# Patient Record
Sex: MALE | Race: WHITE | NOT HISPANIC OR LATINO | URBAN - METROPOLITAN AREA
[De-identification: names, ages, dates, MRNs, and addresses within clinical notes are randomized per-mention and may not be internally consistent; named-entity substitution may affect disease eponyms.]

---

## 2017-06-05 ENCOUNTER — OUTPATIENT (OUTPATIENT)
Dept: OUTPATIENT SERVICES | Facility: HOSPITAL | Age: 82
LOS: 1 days | Discharge: HOME | End: 2017-06-05

## 2017-06-28 DIAGNOSIS — C61 MALIGNANT NEOPLASM OF PROSTATE: ICD-10-CM

## 2018-04-12 ENCOUNTER — OUTPATIENT (OUTPATIENT)
Dept: OUTPATIENT SERVICES | Facility: HOSPITAL | Age: 83
LOS: 1 days | Discharge: HOME | End: 2018-04-12

## 2018-04-12 DIAGNOSIS — I25.10 ATHEROSCLEROTIC HEART DISEASE OF NATIVE CORONARY ARTERY WITHOUT ANGINA PECTORIS: ICD-10-CM

## 2018-04-12 DIAGNOSIS — E78.00 PURE HYPERCHOLESTEROLEMIA, UNSPECIFIED: ICD-10-CM

## 2018-04-12 DIAGNOSIS — Z79.899 OTHER LONG TERM (CURRENT) DRUG THERAPY: ICD-10-CM

## 2018-05-22 ENCOUNTER — MESSAGE (OUTPATIENT)
Age: 83
End: 2018-05-22

## 2018-06-18 ENCOUNTER — OUTPATIENT (OUTPATIENT)
Dept: OUTPATIENT SERVICES | Facility: HOSPITAL | Age: 83
LOS: 1 days | Discharge: HOME | End: 2018-06-18

## 2018-06-18 DIAGNOSIS — C61 MALIGNANT NEOPLASM OF PROSTATE: ICD-10-CM

## 2018-06-19 LAB — PSA SERPL-MCNC: 0.21 NG/ML

## 2018-07-18 ENCOUNTER — APPOINTMENT (OUTPATIENT)
Dept: UROLOGY | Facility: CLINIC | Age: 83
End: 2018-07-18
Payer: MEDICARE

## 2018-07-18 VITALS
SYSTOLIC BLOOD PRESSURE: 199 MMHG | HEART RATE: 58 BPM | BODY MASS INDEX: 29.41 KG/M2 | DIASTOLIC BLOOD PRESSURE: 81 MMHG | WEIGHT: 183 LBS | HEIGHT: 66 IN

## 2018-07-18 PROCEDURE — 99213 OFFICE O/P EST LOW 20 MIN: CPT

## 2019-03-27 ENCOUNTER — OUTPATIENT (OUTPATIENT)
Dept: OUTPATIENT SERVICES | Facility: HOSPITAL | Age: 84
LOS: 1 days | Discharge: HOME | End: 2019-03-27

## 2019-03-27 DIAGNOSIS — I10 ESSENTIAL (PRIMARY) HYPERTENSION: ICD-10-CM

## 2019-03-27 DIAGNOSIS — E78.5 HYPERLIPIDEMIA, UNSPECIFIED: ICD-10-CM

## 2019-03-27 DIAGNOSIS — R53.83 OTHER FATIGUE: ICD-10-CM

## 2019-04-11 ENCOUNTER — RECORD ABSTRACTING (OUTPATIENT)
Age: 84
End: 2019-04-11

## 2019-04-11 RX ORDER — UBIDECARENONE 200 MG
CAPSULE ORAL
Refills: 0 | Status: ACTIVE | COMMUNITY

## 2019-04-11 RX ORDER — CHROMIUM 200 MCG
TABLET ORAL
Refills: 0 | Status: ACTIVE | COMMUNITY

## 2019-04-11 RX ORDER — ASCORBIC ACID 500 MG
TABLET ORAL
Refills: 0 | Status: ACTIVE | COMMUNITY

## 2019-04-11 RX ORDER — ASPIRIN 81 MG
81 TABLET, DELAYED RELEASE (ENTERIC COATED) ORAL DAILY
Refills: 0 | Status: ACTIVE | COMMUNITY

## 2019-05-01 ENCOUNTER — APPOINTMENT (OUTPATIENT)
Dept: CARDIOLOGY | Facility: CLINIC | Age: 84
End: 2019-05-01
Payer: MEDICARE

## 2019-05-01 PROCEDURE — 99214 OFFICE O/P EST MOD 30 MIN: CPT

## 2019-05-01 PROCEDURE — 93000 ELECTROCARDIOGRAM COMPLETE: CPT

## 2019-07-23 ENCOUNTER — APPOINTMENT (OUTPATIENT)
Dept: UROLOGY | Facility: CLINIC | Age: 84
End: 2019-07-23
Payer: MEDICARE

## 2019-07-23 VITALS
HEART RATE: 74 BPM | DIASTOLIC BLOOD PRESSURE: 75 MMHG | HEIGHT: 66 IN | SYSTOLIC BLOOD PRESSURE: 153 MMHG | BODY MASS INDEX: 27.48 KG/M2 | WEIGHT: 171 LBS

## 2019-07-23 LAB
BILIRUB UR QL STRIP: NORMAL
CLARITY UR: CLEAR
COLLECTION METHOD: NORMAL
GLUCOSE UR-MCNC: NORMAL
HCG UR QL: NORMAL EU/DL
HGB UR QL STRIP.AUTO: NORMAL
KETONES UR-MCNC: NORMAL
LEUKOCYTE ESTERASE UR QL STRIP: NORMAL
NITRITE UR QL STRIP: NORMAL
PH UR STRIP: 6
PROT UR STRIP-MCNC: ABNORMAL
SP GR UR STRIP: 1.01

## 2019-07-23 PROCEDURE — 99213 OFFICE O/P EST LOW 20 MIN: CPT

## 2019-07-23 PROCEDURE — 81003 URINALYSIS AUTO W/O SCOPE: CPT | Mod: QW

## 2019-07-23 NOTE — REVIEW OF SYSTEMS
[Fever] : no fever [Shortness Of Breath] : no shortness of breath [Chest Pain] : no chest pain [Constipation] : no constipation [Dysuria] : no dysuria [Diarrhea] : no diarrhea [Confused] : no confusion

## 2019-07-23 NOTE — HISTORY OF PRESENT ILLNESS
[FreeTextEntry1] : 85-year-old with history of prostate cancer first diagnosed incidentally from open prostatectomy specimen for urinary retention. There was a focus of Renato 6. Because of rising PSA he underwent radiation therapy 5 years ago. PSA one year ago was 0.2 when he has not had one since. Currently he has no urinary incontinence, no urinary retention, no urinary urgency, no frequency, no straining, no weak stream, no intermittency, no dysuria, no gross hematuria, no abdominal pain, no flank pain and no bone pain.

## 2019-07-23 NOTE — PHYSICAL EXAM
[Normal Appearance] : normal appearance [General Appearance - In No Acute Distress] : no acute distress [Prostate Tenderness] : the prostate was not tender [No Prostate Nodules] : no prostate nodules [Prostate Size ___ (0-4)] : prostate size [unfilled] (scale: 0-4) [] : no respiratory distress [Oriented To Time, Place, And Person] : oriented to person, place, and time [Normal Station and Gait] : the gait and station were normal for the patient's age

## 2019-07-24 ENCOUNTER — OUTPATIENT (OUTPATIENT)
Dept: OUTPATIENT SERVICES | Facility: HOSPITAL | Age: 84
LOS: 1 days | Discharge: HOME | End: 2019-07-24

## 2019-07-24 DIAGNOSIS — C61 MALIGNANT NEOPLASM OF PROSTATE: ICD-10-CM

## 2019-07-25 LAB — PSA SERPL-MCNC: 0.2 NG/ML

## 2019-11-07 ENCOUNTER — OUTPATIENT (OUTPATIENT)
Dept: OUTPATIENT SERVICES | Facility: HOSPITAL | Age: 84
LOS: 1 days | Discharge: HOME | End: 2019-11-07

## 2019-11-07 DIAGNOSIS — M17.11 UNILATERAL PRIMARY OSTEOARTHRITIS, RIGHT KNEE: ICD-10-CM

## 2019-11-20 ENCOUNTER — APPOINTMENT (OUTPATIENT)
Dept: CARDIOLOGY | Facility: CLINIC | Age: 84
End: 2019-11-20
Payer: MEDICARE

## 2019-11-20 PROCEDURE — 99214 OFFICE O/P EST MOD 30 MIN: CPT

## 2019-11-20 PROCEDURE — 93000 ELECTROCARDIOGRAM COMPLETE: CPT

## 2020-03-10 ENCOUNTER — OUTPATIENT (OUTPATIENT)
Dept: OUTPATIENT SERVICES | Facility: HOSPITAL | Age: 85
LOS: 1 days | Discharge: HOME | End: 2020-03-10

## 2020-03-10 DIAGNOSIS — S83.90XA SPRAIN OF UNSPECIFIED SITE OF UNSPECIFIED KNEE, INITIAL ENCOUNTER: ICD-10-CM

## 2020-03-10 DIAGNOSIS — M17.11 UNILATERAL PRIMARY OSTEOARTHRITIS, RIGHT KNEE: ICD-10-CM

## 2020-07-22 ENCOUNTER — APPOINTMENT (OUTPATIENT)
Dept: CARDIOLOGY | Facility: CLINIC | Age: 85
End: 2020-07-22
Payer: MEDICARE

## 2020-07-22 PROCEDURE — 99214 OFFICE O/P EST MOD 30 MIN: CPT

## 2020-07-22 PROCEDURE — 93000 ELECTROCARDIOGRAM COMPLETE: CPT

## 2020-07-24 ENCOUNTER — APPOINTMENT (OUTPATIENT)
Dept: UROLOGY | Facility: CLINIC | Age: 85
End: 2020-07-24
Payer: MEDICARE

## 2020-07-24 PROCEDURE — 99213 OFFICE O/P EST LOW 20 MIN: CPT

## 2020-07-24 NOTE — HISTORY OF PRESENT ILLNESS
[FreeTextEntry1] : 86-year-old with history of suprapubic prostatectomy for urinary retention. Incidental prostate cancer and has undergone radiation therapy for that. Patient has no urinary complaints has good urinary flow, no frequency or urgency, no dysuria, no gross hematuria. PSA one year ago was 0.2.

## 2020-07-24 NOTE — ASSESSMENT
[FreeTextEntry1] : Patient with no signs or symptoms of progression of prostate cancer. New PSA ordered. No significant urinary symptoms.

## 2020-07-24 NOTE — PHYSICAL EXAM
[General Appearance - In No Acute Distress] : no acute distress [Prostate Size ___ (0-4)] : prostate size [unfilled] (scale: 0-4) [No Prostate Nodules] : no prostate nodules [Prostate Tenderness] : the prostate was not tender [Edema] : no peripheral edema [Oriented To Time, Place, And Person] : oriented to person, place, and time [] : no respiratory distress [Not Anxious] : not anxious [Normal Station and Gait] : the gait and station were normal for the patient's age

## 2020-07-24 NOTE — REVIEW OF SYSTEMS
[Feeling Tired] : not feeling tired [Feeling Poorly] : not feeling poorly [Nasal Discharge] : no nasal discharge [Chest Pain] : no chest pain [Shortness Of Breath] : no shortness of breath [Constipation] : no constipation [Diarrhea] : no diarrhea [Cough] : no cough [Dysuria] : no dysuria [Confused] : no confusion

## 2020-10-06 ENCOUNTER — RX RENEWAL (OUTPATIENT)
Age: 85
End: 2020-10-06

## 2020-12-18 ENCOUNTER — RX RENEWAL (OUTPATIENT)
Age: 85
End: 2020-12-18

## 2021-01-20 ENCOUNTER — APPOINTMENT (OUTPATIENT)
Dept: CARDIOLOGY | Facility: CLINIC | Age: 86
End: 2021-01-20
Payer: MEDICARE

## 2021-01-20 VITALS
SYSTOLIC BLOOD PRESSURE: 152 MMHG | HEIGHT: 66 IN | DIASTOLIC BLOOD PRESSURE: 78 MMHG | WEIGHT: 174 LBS | HEART RATE: 70 BPM | BODY MASS INDEX: 27.97 KG/M2 | TEMPERATURE: 97.8 F

## 2021-01-20 PROCEDURE — 99213 OFFICE O/P EST LOW 20 MIN: CPT

## 2021-01-20 PROCEDURE — 93000 ELECTROCARDIOGRAM COMPLETE: CPT

## 2021-01-20 NOTE — PHYSICAL EXAM
[General Appearance - Well Developed] : well developed [Normal Appearance] : normal appearance [Well Groomed] : well groomed [General Appearance - Well Nourished] : well nourished [No Deformities] : no deformities [Normal Conjunctiva] : the conjunctiva exhibited no abnormalities [General Appearance - In No Acute Distress] : no acute distress [Eyelids - No Xanthelasma] : the eyelids demonstrated no xanthelasmas [Normal Oral Mucosa] : normal oral mucosa [No Oral Pallor] : no oral pallor [No Oral Cyanosis] : no oral cyanosis [] : no respiratory distress [Auscultation Breath Sounds / Voice Sounds] : lungs were clear to auscultation bilaterally [Heart Rate And Rhythm] : heart rate and rhythm were normal [Heart Sounds] : normal S1 and S2 [Bowel Sounds] : normal bowel sounds [Abdomen Mass (___ Cm)] : no abdominal mass palpated [Abdomen Tenderness] : non-tender [Nail Clubbing] : no clubbing of the fingernails [Cyanosis, Localized] : no localized cyanosis [Oriented To Time, Place, And Person] : oriented to person, place, and time [FreeTextEntry1] : ant scar

## 2021-01-20 NOTE — ASSESSMENT
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

## 2021-01-25 ENCOUNTER — RX RENEWAL (OUTPATIENT)
Age: 86
End: 2021-01-25

## 2021-07-27 ENCOUNTER — APPOINTMENT (OUTPATIENT)
Dept: UROLOGY | Facility: CLINIC | Age: 86
End: 2021-07-27
Payer: MEDICARE

## 2021-07-27 LAB — PSA SERPL-MCNC: 0.21 NG/ML

## 2021-07-27 PROCEDURE — 99213 OFFICE O/P EST LOW 20 MIN: CPT

## 2021-07-27 NOTE — REVIEW OF SYSTEMS
[Fever] : no fever [Feeling Poorly] : not feeling poorly [Nosebleeds] : no nosebleeds [Chest Pain] : no chest pain [Shortness Of Breath] : no shortness of breath [Constipation] : constipation [Diarrhea] : no diarrhea [Dysuria] : no dysuria

## 2021-07-27 NOTE — HISTORY OF PRESENT ILLNESS
[FreeTextEntry1] : 87-year-old with history of prostate cancer status post radiation therapy. He was diagnosed incidentally during a suprapubic prostatectomy for urinary retention. PSA is 0.2 compared to 0.2 one year ago. No urinary complaints. [Urinary Incontinence] : no urinary incontinence [Urinary Retention] : no urinary retention [Straining] : no straining [Dysuria] : no dysuria [Hematuria - Gross] : no gross hematuria [Flank Pain] : no flank pain

## 2021-08-02 ENCOUNTER — APPOINTMENT (OUTPATIENT)
Dept: CARDIOLOGY | Facility: CLINIC | Age: 86
End: 2021-08-02
Payer: MEDICARE

## 2021-08-02 VITALS
BODY MASS INDEX: 27 KG/M2 | DIASTOLIC BLOOD PRESSURE: 62 MMHG | WEIGHT: 168 LBS | HEIGHT: 66 IN | HEART RATE: 62 BPM | SYSTOLIC BLOOD PRESSURE: 144 MMHG | TEMPERATURE: 98 F

## 2021-08-02 PROCEDURE — 93306 TTE W/DOPPLER COMPLETE: CPT

## 2021-08-02 PROCEDURE — 93000 ELECTROCARDIOGRAM COMPLETE: CPT

## 2021-08-02 PROCEDURE — 99213 OFFICE O/P EST LOW 20 MIN: CPT

## 2021-08-02 RX ORDER — NABUMETONE 500 MG/1
500 TABLET, FILM COATED ORAL
Refills: 0 | Status: COMPLETED | COMMUNITY
End: 2021-08-02

## 2021-08-02 RX ORDER — BENAZEPRIL HYDROCHLORIDE 5 MG/1
TABLET ORAL
Refills: 0 | Status: COMPLETED | COMMUNITY
End: 2021-08-02

## 2021-08-02 RX ORDER — METOPROLOL TARTRATE 75 MG/1
TABLET, FILM COATED ORAL
Refills: 0 | Status: COMPLETED | COMMUNITY
End: 2021-08-02

## 2021-08-02 NOTE — PHYSICAL EXAM
[General Appearance - Well Developed] : well developed [Normal Appearance] : normal appearance [Well Groomed] : well groomed [General Appearance - Well Nourished] : well nourished [No Deformities] : no deformities [General Appearance - In No Acute Distress] : no acute distress [Normal Conjunctiva] : the conjunctiva exhibited no abnormalities [Eyelids - No Xanthelasma] : the eyelids demonstrated no xanthelasmas [Normal Oral Mucosa] : normal oral mucosa [No Oral Pallor] : no oral pallor [No Oral Cyanosis] : no oral cyanosis [] : no respiratory distress [Auscultation Breath Sounds / Voice Sounds] : lungs were clear to auscultation bilaterally [Heart Rate And Rhythm] : heart rate and rhythm were normal [Heart Sounds] : normal S1 and S2 [Bowel Sounds] : normal bowel sounds [Abdomen Tenderness] : non-tender [Abdomen Mass (___ Cm)] : no abdominal mass palpated [Nail Clubbing] : no clubbing of the fingernails [Cyanosis, Localized] : no localized cyanosis [Oriented To Time, Place, And Person] : oriented to person, place, and time [FreeTextEntry1] : ant scar

## 2021-08-02 NOTE — ASSESSMENT
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

## 2021-09-03 ENCOUNTER — RX RENEWAL (OUTPATIENT)
Age: 86
End: 2021-09-03

## 2022-01-08 ENCOUNTER — RX RENEWAL (OUTPATIENT)
Age: 87
End: 2022-01-08

## 2022-02-16 ENCOUNTER — RX RENEWAL (OUTPATIENT)
Age: 87
End: 2022-02-16

## 2022-03-08 PROBLEM — Z78.9 NON-SMOKER: Status: ACTIVE | Noted: 2018-07-18

## 2022-03-08 PROBLEM — Z78.9 SOCIAL ALCOHOL USE: Status: ACTIVE | Noted: 2018-07-18

## 2022-03-09 ENCOUNTER — APPOINTMENT (OUTPATIENT)
Dept: CARDIOLOGY | Facility: CLINIC | Age: 87
End: 2022-03-09

## 2022-03-09 ENCOUNTER — APPOINTMENT (OUTPATIENT)
Dept: CARDIOLOGY | Facility: CLINIC | Age: 87
End: 2022-03-09
Payer: MEDICARE

## 2022-03-09 VITALS — SYSTOLIC BLOOD PRESSURE: 160 MMHG | HEART RATE: 70 BPM | DIASTOLIC BLOOD PRESSURE: 80 MMHG

## 2022-03-09 VITALS — TEMPERATURE: 98 F | WEIGHT: 167 LBS | BODY MASS INDEX: 26.84 KG/M2 | HEIGHT: 66 IN

## 2022-03-09 DIAGNOSIS — Z78.9 OTHER SPECIFIED HEALTH STATUS: ICD-10-CM

## 2022-03-09 PROCEDURE — 99213 OFFICE O/P EST LOW 20 MIN: CPT

## 2022-03-09 PROCEDURE — 93000 ELECTROCARDIOGRAM COMPLETE: CPT

## 2022-03-09 RX ORDER — ALLOPURINOL 100 MG/1
100 TABLET ORAL
Refills: 0 | Status: ACTIVE | COMMUNITY

## 2022-03-09 RX ORDER — ASPIRIN 81 MG/1
81 TABLET, COATED ORAL
Refills: 0 | Status: DISCONTINUED | COMMUNITY
End: 2022-03-09

## 2022-03-09 RX ORDER — NABUMETONE 500 MG/1
500 TABLET, FILM COATED ORAL TWICE DAILY
Refills: 0 | Status: DISCONTINUED | COMMUNITY
End: 2022-03-09

## 2022-03-09 NOTE — REASON FOR VISIT
[Other: ____] : [unfilled] [FreeTextEntry1] : Diagnostic Tests:\par ---------------------\par EKG:\par 03/09/22-NSR with 1st degree AVB. Old inferior infarct. \par 08/02/21-NSR with 1st degree AVB. Old inferior infarct. \par ---------------------\par Echo:\par 08/02/21-EF normal. Grade I DD. Moderate MAC. PASP 35 mmHg. Dilated ascending aorta, 3.7 cm.

## 2022-03-09 NOTE — HISTORY OF PRESENT ILLNESS
[FreeTextEntry1] : Mr. Mcmanus is an 89yo M with PMHx of CAD s/p CABG (LIMA-LAD, SVG-D1, PLV and PDA), HTN, HLD, lymphoma and prostate CA who presents to establish care. His PMD is Dr. Hebert and previously followed with Dr. Askhan Moran. Patient complains of some intermittent B/L chest pain. It will only last a few minutes and not always associated with exertion. Denies SOB, palpitations, lightheadedness/dizziness.

## 2022-03-09 NOTE — ASSESSMENT
[FreeTextEntry1] : Chest pain/CAD: May be angina in nature. \par -Discussed options to further investigate CP: Stress test vs. medication modification.\par -Will increase Toprol 25mg PO daily to Toprol 50mg PO daily.\par -Patient to follow up in 1 month to see if symptoms improved.\par -Check TTE.\par -If still experiencing CP, will need to send for pharm nuclear stress. \par -Continue with ASA, statin and benazepril. \par \par HTN: BP not at goal per ACC/AHA 2018 guidelines\par -Patient did not take meds today.\par -Patient will check BP at home 3-4x/week and keep log.\par -Will check next visit.\par -Continue with current meds. \par \par HLD: Need labs\par -Continue with atorvastatin 20mg PO daily. \par \par Follow up in 1 month

## 2022-03-09 NOTE — REVIEW OF SYSTEMS
[Negative] : Heme/Lymph [Chest Discomfort] : chest discomfort [SOB] : no shortness of breath [Dyspnea on exertion] : not dyspnea during exertion

## 2022-03-30 ENCOUNTER — APPOINTMENT (OUTPATIENT)
Dept: CARDIOLOGY | Facility: CLINIC | Age: 87
End: 2022-03-30
Payer: MEDICARE

## 2022-03-30 PROCEDURE — 93306 TTE W/DOPPLER COMPLETE: CPT

## 2022-04-13 ENCOUNTER — APPOINTMENT (OUTPATIENT)
Dept: CARDIOLOGY | Facility: CLINIC | Age: 87
End: 2022-04-13
Payer: MEDICARE

## 2022-04-13 VITALS — SYSTOLIC BLOOD PRESSURE: 142 MMHG | DIASTOLIC BLOOD PRESSURE: 70 MMHG | HEART RATE: 59 BPM

## 2022-04-13 VITALS — BODY MASS INDEX: 28.68 KG/M2 | WEIGHT: 168 LBS | HEIGHT: 64 IN | TEMPERATURE: 97.6 F

## 2022-04-13 PROCEDURE — 99212 OFFICE O/P EST SF 10 MIN: CPT

## 2022-04-13 PROCEDURE — 93000 ELECTROCARDIOGRAM COMPLETE: CPT

## 2022-04-13 RX ORDER — METOPROLOL SUCCINATE 25 MG/1
25 TABLET, EXTENDED RELEASE ORAL
Qty: 90 | Refills: 3 | Status: DISCONTINUED | COMMUNITY
Start: 2020-12-18 | End: 2022-04-13

## 2022-04-13 NOTE — ASSESSMENT
[FreeTextEntry1] : Chest pain/CAD: May be anginal in nature. \par - Patient reported an improvement in symptoms after Toprol was increased to 50 gm PO QD \par - Will continue Toprol 50 gm \par - will hold on stress test as patient's symptoms of chest pain improved.  \par - Continue with ASA, statin and benazepril. \par \par HTN: BP not at goal per ACC/AHA 2018 guidelines\par - Patient did not take meds today.\par - Patient was not able to measure his BP at home. Borderline hypertensive at the time of the visit. \par - Continue with current meds. \par - Diastolic dysfunction is evident on recent TTE. Blood pressure control is emphasized. \par \par HLD: Need labs\par -Continue with rosuvastatin 20mg PO daily. \par - Blood work (lipid profile etc) was ordered\par - risk factors modification\par \par Follow up in 3 months

## 2022-04-13 NOTE — HISTORY OF PRESENT ILLNESS
[FreeTextEntry1] : Mr. Mcmanus is an 89yo M with PMHx of CAD s/p CABG (LIMA-LAD, SVG-D1, PLV and PDA), HTN, HLD, lymphoma and prostate CA who presents to \[facility]\"" care. His PMD is Dr. Hebert and previously followed with Dr. Ashkan Moran. Patient complains of some intermittent B/L chest pain. It will only last a few minutes and not always associated with exertion. Denies SOB, palpitations, lightheadedness/dizziness. \par \par 04/13/2022: Patient presents for F/U cardiology visit. He denies chest pain, reports an improvement after Toprol dose was increased. He was not able to check BP at home. Borderline hypertensive at the time of the visit ( patient didn't take BP medications today prior to the visit). He denies lightheadedness, SOB, palpitations. LE edema noted on exam and as per patient it fluctuates from day to day. He has a Hx psoriasis, rash noted in the area of the left knee ( psoriatic rash?)

## 2022-04-13 NOTE — PHYSICAL EXAM
[Well Developed] : well developed [Well Nourished] : well nourished [No Acute Distress] : no acute distress [Normal Conjunctiva] : normal conjunctiva [Normal Venous Pressure] : normal venous pressure [5th Left ICS - MCL] : palpated at the 5th LICS in the midclavicular line [Normal] : normal [No Precordial Heave] : no precordial heave was noted [Normal Rate] : normal [Rhythm Regular] : regular [Normal S1] : normal S1 [Normal S2] : normal S2 [No Murmur] : no murmurs heard [No Pitting Edema] : no pitting edema present [2+] : left 2+ [Clear Lung Fields] : clear lung fields [Good Air Entry] : good air entry [No Respiratory Distress] : no respiratory distress  [Soft] : abdomen soft [Non Tender] : non-tender [Normal Bowel Sounds] : normal bowel sounds [Normal Gait] : normal gait [No Edema] : no edema [No Varicosities] : no varicosities [Moves all extremities] : moves all extremities [No Focal Deficits] : no focal deficits [Alert and Oriented] : alert and oriented [de-identified] : 4-5 small, red plaques on medial aspect of left knee

## 2022-04-13 NOTE — REASON FOR VISIT
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 \par 03/09/22-NSR with 1st degree AVB. Old inferior infarct. \par 08/02/21-NSR with 1st degree AVB. Old inferior infarct. \par ---------------------\par Echo:\par 03/30/2022: EF 60%, Grade III diastolic dysfunction with severely elevated LA pressures, restrictive diastolic function. Mild calcification of AV and MV. Mild to moderate MR. Severe GA. Mild TR. Normal PA pressures.\par 08/02/21-EF normal. Grade I DD. Moderate MAC. PASP 35 mmHg. Dilated ascending aorta, 3.7 cm.

## 2022-04-13 NOTE — REVIEW OF SYSTEMS
[Negative] : Heme/Lymph [SOB] : no shortness of breath [Dyspnea on exertion] : not dyspnea during exertion [Chest Discomfort] : no chest discomfort

## 2022-07-29 ENCOUNTER — APPOINTMENT (OUTPATIENT)
Dept: UROLOGY | Facility: CLINIC | Age: 87
End: 2022-07-29

## 2022-07-29 PROCEDURE — 99213 OFFICE O/P EST LOW 20 MIN: CPT

## 2022-07-29 NOTE — HISTORY OF PRESENT ILLNESS
[FreeTextEntry1] : 88-year-old with history of prostate cancer status post radiation therapy.  The cancer was diagnosed incidentally during a suprapubic prostatectomy for urinary retention.  No recent PSA.  No urinary complaint.  No bowel complaints

## 2022-08-15 LAB — PSA SERPL-MCNC: 0.24 NG/ML

## 2022-09-07 ENCOUNTER — APPOINTMENT (OUTPATIENT)
Dept: CARDIOLOGY | Facility: CLINIC | Age: 87
End: 2022-09-07

## 2022-09-07 VITALS — SYSTOLIC BLOOD PRESSURE: 136 MMHG | HEART RATE: 72 BPM | DIASTOLIC BLOOD PRESSURE: 70 MMHG

## 2022-09-07 VITALS — BODY MASS INDEX: 28.85 KG/M2 | HEIGHT: 64 IN | WEIGHT: 169 LBS

## 2022-09-07 VITALS — SYSTOLIC BLOOD PRESSURE: 132 MMHG | DIASTOLIC BLOOD PRESSURE: 68 MMHG

## 2022-09-07 DIAGNOSIS — Z00.00 ENCOUNTER FOR GENERAL ADULT MEDICAL EXAMINATION W/OUT ABNORMAL FINDINGS: ICD-10-CM

## 2022-09-07 PROCEDURE — 93000 ELECTROCARDIOGRAM COMPLETE: CPT

## 2022-09-07 PROCEDURE — 99214 OFFICE O/P EST MOD 30 MIN: CPT

## 2022-09-07 NOTE — REASON FOR VISIT
[Other: ____] : [unfilled] [FreeTextEntry1] : Diagnostic Tests:\par ---------------------\par EKG:\par 09/07/22-NSR with 1st degree AVB. PVC. Old inferior infarct. \par 04/13/22-NSR with 1st degree AVB. Old inferior infarct. \par 03/09/22-NSR with 1st degree AVB. Old inferior infarct. \par 08/02/21-NSR with 1st degree AVB. Old inferior infarct. \par ---------------------\par Echo:\par 03/30/2022: EF 60%, Grade III diastolic dysfunction with severely elevated LA pressures, restrictive diastolic function. Mild calcification of AV and MV. Mild to moderate MR. Severe VA. Mild TR. Normal PA pressures.\par 08/02/21-EF normal. Grade I DD. Moderate MAC. PASP 35 mmHg. Dilated ascending aorta, 3.7 cm.

## 2022-09-07 NOTE — HISTORY OF PRESENT ILLNESS
[FreeTextEntry1] : Mr. Mcmanus is an 87yo M with PMHx of CAD s/p CABG (LIMA-LAD, SVG-D1, PLV and PDA), HTN, HLD, lymphoma and prostate CA who presents for follow up. His PMD is Dr. Selwyn Hebert and previously followed with Dr. Ashkan Moran. Patient complains of some intermittent B/L chest pain. It will only last a few minutes and not always associated with exertion. Denies SOB, palpitations, lightheadedness/dizziness. \par \par 09/07/2022: Patient follows with urology, no recurrence of prostate cancer, PSA is normal. He will at times feel fatigued during the day. CP is much improved. Only other complaints of joint pain which Voltaren gel helps with symptoms. Has upcoming weddings for granddaughters (November, April). \par

## 2022-09-07 NOTE — ASSESSMENT
[FreeTextEntry1] : Chest pain/CAD: May be anginal in nature. \par - Patient reported an improvement in symptoms after Toprol was increased to 50 gm PO QD \par - Will continue Toprol 50mg.\par - If fatigue continues or worsens, will consider decreasing Toprol and trialling long acting nitrates to help with anginal pain. \par - will hold on stress test as patient's symptoms of chest pain improved.  \par - Continue with ASA, statin and benazepril. \par \par HTN: BP at goal per ACC/AHA 2018 guidelines\par - Continue with current meds. \par - Diastolic dysfunction is evident on recent TTE. Blood pressure control is emphasized. \par \par HLD: Need labs\par -Continue with rosuvastatin 20mg PO daily. \par - Blood work (lipid profile etc) was ordered\par - risk factors modification\par \par Follow up in 4 months

## 2022-09-07 NOTE — PHYSICAL EXAM
[Well Developed] : well developed [Well Nourished] : well nourished [No Acute Distress] : no acute distress [Normal Conjunctiva] : normal conjunctiva [Normal Venous Pressure] : normal venous pressure [5th Left ICS - MCL] : palpated at the 5th LICS in the midclavicular line [Normal] : normal [No Precordial Heave] : no precordial heave was noted [Normal Rate] : normal [Rhythm Regular] : regular [Normal S1] : normal S1 [Normal S2] : normal S2 [No Murmur] : no murmurs heard [No Pitting Edema] : no pitting edema present [2+] : left 2+ [Clear Lung Fields] : clear lung fields [Good Air Entry] : good air entry [No Respiratory Distress] : no respiratory distress  [Soft] : abdomen soft [Non Tender] : non-tender [Normal Bowel Sounds] : normal bowel sounds [Normal Gait] : normal gait [No Edema] : no edema [No Varicosities] : no varicosities [Moves all extremities] : moves all extremities [No Focal Deficits] : no focal deficits [Alert and Oriented] : alert and oriented [de-identified] : 4-5 small, red plaques on medial aspect of left knee

## 2022-09-09 LAB
ALBUMIN SERPL ELPH-MCNC: 4.1 G/DL
ALP BLD-CCNC: 105 U/L
ALT SERPL-CCNC: 12 U/L
ANION GAP SERPL CALC-SCNC: 14 MMOL/L
AST SERPL-CCNC: 22 U/L
BASOPHILS # BLD AUTO: 0.07 K/UL
BASOPHILS NFR BLD AUTO: 0.9 %
BILIRUB SERPL-MCNC: 0.3 MG/DL
BUN SERPL-MCNC: 26 MG/DL
CALCIUM SERPL-MCNC: 9.3 MG/DL
CHLORIDE SERPL-SCNC: 105 MMOL/L
CHOLEST SERPL-MCNC: 128 MG/DL
CO2 SERPL-SCNC: 22 MMOL/L
CREAT SERPL-MCNC: 1.3 MG/DL
EGFR: 53 ML/MIN/1.73M2
EOSINOPHIL # BLD AUTO: 0.59 K/UL
EOSINOPHIL NFR BLD AUTO: 7.7 %
ESTIMATED AVERAGE GLUCOSE: 128 MG/DL
GLUCOSE SERPL-MCNC: 107 MG/DL
HBA1C MFR BLD HPLC: 6.1 %
HCT VFR BLD CALC: 39.3 %
HDLC SERPL-MCNC: 37 MG/DL
HGB BLD-MCNC: 12.3 G/DL
IMM GRANULOCYTES NFR BLD AUTO: 0.4 %
LDLC SERPL CALC-MCNC: 78 MG/DL
LDLC SERPL DIRECT ASSAY-MCNC: 73 MG/DL
LYMPHOCYTES # BLD AUTO: 2.63 K/UL
LYMPHOCYTES NFR BLD AUTO: 34.4 %
MAN DIFF?: NORMAL
MCHC RBC-ENTMCNC: 30.4 PG
MCHC RBC-ENTMCNC: 31.3 G/DL
MCV RBC AUTO: 97 FL
MONOCYTES # BLD AUTO: 0.72 K/UL
MONOCYTES NFR BLD AUTO: 9.4 %
NEUTROPHILS # BLD AUTO: 3.6 K/UL
NEUTROPHILS NFR BLD AUTO: 47.2 %
NONHDLC SERPL-MCNC: 91 MG/DL
PLATELET # BLD AUTO: 221 K/UL
POTASSIUM SERPL-SCNC: 4.4 MMOL/L
PROT SERPL-MCNC: 6.6 G/DL
RBC # BLD: 4.05 M/UL
RBC # FLD: 14 %
SODIUM SERPL-SCNC: 141 MMOL/L
TRIGL SERPL-MCNC: 66 MG/DL
TSH SERPL-ACNC: 2.84 UIU/ML
WBC # FLD AUTO: 7.64 K/UL

## 2022-11-16 ENCOUNTER — RX RENEWAL (OUTPATIENT)
Age: 87
End: 2022-11-16

## 2022-11-16 RX ORDER — BENAZEPRIL HYDROCHLORIDE 40 MG/1
40 TABLET, FILM COATED ORAL
Qty: 90 | Refills: 3 | Status: ACTIVE | COMMUNITY
Start: 2020-10-06 | End: 1900-01-01

## 2023-01-11 ENCOUNTER — APPOINTMENT (OUTPATIENT)
Dept: CARDIOLOGY | Facility: CLINIC | Age: 88
End: 2023-01-11
Payer: MEDICARE

## 2023-01-11 VITALS
HEART RATE: 74 BPM | OXYGEN SATURATION: 96 % | HEIGHT: 64 IN | DIASTOLIC BLOOD PRESSURE: 84 MMHG | SYSTOLIC BLOOD PRESSURE: 138 MMHG | WEIGHT: 165 LBS | BODY MASS INDEX: 28.17 KG/M2

## 2023-01-11 PROCEDURE — 93000 ELECTROCARDIOGRAM COMPLETE: CPT

## 2023-01-11 PROCEDURE — 99213 OFFICE O/P EST LOW 20 MIN: CPT

## 2023-01-11 NOTE — REASON FOR VISIT
[Other: ____] : [unfilled] [FreeTextEntry1] : Diagnostic Tests:\par ---------------------\par EKG:\par 01/11/23-Sinus bradycardia at 57 bpm with 1st degree AVB. Old inferior infarct. \par 09/07/22-NSR with 1st degree AVB. PVC. Old inferior infarct. \par 04/13/22-NSR with 1st degree AVB. Old inferior infarct. \par 03/09/22-NSR with 1st degree AVB. Old inferior infarct. \par 08/02/21-NSR with 1st degree AVB. Old inferior infarct. \par ---------------------\par Echo:\par 03/30/2022: EF 60%, Grade III diastolic dysfunction with severely elevated LA pressures, restrictive diastolic function. Mild calcification of AV and MV. Mild to moderate MR. Severe RI. Mild TR. Normal PA pressures.\par 08/02/21-EF normal. Grade I DD. Moderate MAC. PASP 35 mmHg. Dilated ascending aorta, 3.7 cm.

## 2023-01-11 NOTE — HISTORY OF PRESENT ILLNESS
[FreeTextEntry1] : Mr. Mcmanus is an 89yo M with PMHx of CAD s/p CABG (LIMA-LAD, SVG-D1, PLV and PDA), HTN, HLD, lymphoma and prostate CA who presents for follow up. His PMD is Dr. Selwyn Hebert and previously followed with Dr. Ashkan Moran. Patient complains of some intermittent B/L chest pain. It will only last a few minutes and not always associated with exertion. Denies SOB, palpitations, lightheadedness/dizziness. \par \par Patient is feeling well. He had COVID in December and took Paxlovid. He has some fatigue, but not worse from baseline. He will have intermittent swelling of his right hand when he wakes up, but it will subside after about 30 minutes. \par

## 2023-01-11 NOTE — PHYSICAL EXAM
[Well Developed] : well developed [Well Nourished] : well nourished [No Acute Distress] : no acute distress [Normal Conjunctiva] : normal conjunctiva [Normal Venous Pressure] : normal venous pressure [5th Left ICS - MCL] : palpated at the 5th LICS in the midclavicular line [Normal] : normal [No Precordial Heave] : no precordial heave was noted [Normal Rate] : normal [Rhythm Regular] : regular [Normal S1] : normal S1 [Normal S2] : normal S2 [No Murmur] : no murmurs heard [No Pitting Edema] : no pitting edema present [2+] : left 2+ [Clear Lung Fields] : clear lung fields [Good Air Entry] : good air entry [No Respiratory Distress] : no respiratory distress  [Soft] : abdomen soft [Non Tender] : non-tender [Normal Bowel Sounds] : normal bowel sounds [Normal Gait] : normal gait [No Edema] : no edema [No Varicosities] : no varicosities [Moves all extremities] : moves all extremities [No Focal Deficits] : no focal deficits [Alert and Oriented] : alert and oriented [de-identified] : 4-5 small, red plaques on medial aspect of left knee

## 2023-01-11 NOTE — ASSESSMENT
[FreeTextEntry1] : Chest pain/CAD: May be anginal in nature. \par - Patient reported an improvement in symptoms after Toprol was increased to 50 gm PO QD \par - Continue Toprol 50mg.\par - If fatigue continues or worsens, will consider decreasing Toprol and trialling long acting nitrates to help with anginal pain. \par - will hold on stress test as patient's symptoms of chest pain improved.  \par - Continue with ASA, statin and benazepril. \par \par HTN: BP at goal per ACC/AHA 2018 guidelines\par - Continue with current meds. \par - Diastolic dysfunction is evident on recent TTE. Blood pressure control is emphasized. \par \par HLD: , TG 66, HDL 37, LDL 78 (09/22)\par -Continue with rosuvastatin 20mg PO daily. \par - Blood work (lipid profile etc) was ordered\par - risk factors modification\par \par Right hand swelling: \par -B/L BP without difference.\par -No swelling on exam today. \par -Will monitor for worsening of symptoms.\par -Defer US imaging. \par \par Follow up in 4 months

## 2023-02-08 ENCOUNTER — EMERGENCY (EMERGENCY)
Facility: HOSPITAL | Age: 88
LOS: 0 days | Discharge: HOME | End: 2023-02-08
Attending: EMERGENCY MEDICINE
Payer: MEDICARE

## 2023-02-08 VITALS
RESPIRATION RATE: 18 BRPM | DIASTOLIC BLOOD PRESSURE: 95 MMHG | SYSTOLIC BLOOD PRESSURE: 211 MMHG | OXYGEN SATURATION: 99 % | HEART RATE: 58 BPM

## 2023-02-08 DIAGNOSIS — Z20.822 CONTACT WITH AND (SUSPECTED) EXPOSURE TO COVID-19: ICD-10-CM

## 2023-02-08 DIAGNOSIS — Z85.46 PERSONAL HISTORY OF MALIGNANT NEOPLASM OF PROSTATE: ICD-10-CM

## 2023-02-08 DIAGNOSIS — R11.2 NAUSEA WITH VOMITING, UNSPECIFIED: ICD-10-CM

## 2023-02-08 DIAGNOSIS — I10 ESSENTIAL (PRIMARY) HYPERTENSION: ICD-10-CM

## 2023-02-08 DIAGNOSIS — R61 GENERALIZED HYPERHIDROSIS: ICD-10-CM

## 2023-02-08 DIAGNOSIS — E78.5 HYPERLIPIDEMIA, UNSPECIFIED: ICD-10-CM

## 2023-02-08 DIAGNOSIS — R07.89 OTHER CHEST PAIN: ICD-10-CM

## 2023-02-08 DIAGNOSIS — Z95.1 PRESENCE OF AORTOCORONARY BYPASS GRAFT: ICD-10-CM

## 2023-02-08 DIAGNOSIS — I25.10 ATHEROSCLEROTIC HEART DISEASE OF NATIVE CORONARY ARTERY WITHOUT ANGINA PECTORIS: ICD-10-CM

## 2023-02-08 DIAGNOSIS — Z85.72 PERSONAL HISTORY OF NON-HODGKIN LYMPHOMAS: ICD-10-CM

## 2023-02-08 DIAGNOSIS — R10.13 EPIGASTRIC PAIN: ICD-10-CM

## 2023-02-08 LAB
ALBUMIN SERPL ELPH-MCNC: 4.3 G/DL — SIGNIFICANT CHANGE UP (ref 3.5–5.2)
ALP SERPL-CCNC: 114 U/L — SIGNIFICANT CHANGE UP (ref 30–115)
ALT FLD-CCNC: 14 U/L — SIGNIFICANT CHANGE UP (ref 0–41)
ANION GAP SERPL CALC-SCNC: 10 MMOL/L — SIGNIFICANT CHANGE UP (ref 7–14)
AST SERPL-CCNC: 25 U/L — SIGNIFICANT CHANGE UP (ref 0–41)
BASOPHILS # BLD AUTO: 0.08 K/UL — SIGNIFICANT CHANGE UP (ref 0–0.2)
BASOPHILS NFR BLD AUTO: 0.9 % — SIGNIFICANT CHANGE UP (ref 0–1)
BILIRUB SERPL-MCNC: 0.3 MG/DL — SIGNIFICANT CHANGE UP (ref 0.2–1.2)
BUN SERPL-MCNC: 26 MG/DL — HIGH (ref 10–20)
CALCIUM SERPL-MCNC: 9.2 MG/DL — SIGNIFICANT CHANGE UP (ref 8.4–10.5)
CHLORIDE SERPL-SCNC: 104 MMOL/L — SIGNIFICANT CHANGE UP (ref 98–110)
CO2 SERPL-SCNC: 24 MMOL/L — SIGNIFICANT CHANGE UP (ref 17–32)
CREAT SERPL-MCNC: 1.2 MG/DL — SIGNIFICANT CHANGE UP (ref 0.7–1.5)
EGFR: 58 ML/MIN/1.73M2 — LOW
EOSINOPHIL # BLD AUTO: 0.64 K/UL — SIGNIFICANT CHANGE UP (ref 0–0.7)
EOSINOPHIL NFR BLD AUTO: 7.2 % — SIGNIFICANT CHANGE UP (ref 0–8)
GLUCOSE SERPL-MCNC: 140 MG/DL — HIGH (ref 70–99)
HCT VFR BLD CALC: 35.9 % — LOW (ref 42–52)
HGB BLD-MCNC: 11.8 G/DL — LOW (ref 14–18)
IMM GRANULOCYTES NFR BLD AUTO: 0.5 % — HIGH (ref 0.1–0.3)
LIDOCAIN IGE QN: 36 U/L — SIGNIFICANT CHANGE UP (ref 7–60)
LYMPHOCYTES # BLD AUTO: 2.13 K/UL — SIGNIFICANT CHANGE UP (ref 1.2–3.4)
LYMPHOCYTES # BLD AUTO: 24 % — SIGNIFICANT CHANGE UP (ref 20.5–51.1)
MAGNESIUM SERPL-MCNC: 2.1 MG/DL — SIGNIFICANT CHANGE UP (ref 1.8–2.4)
MCHC RBC-ENTMCNC: 30.9 PG — SIGNIFICANT CHANGE UP (ref 27–31)
MCHC RBC-ENTMCNC: 32.9 G/DL — SIGNIFICANT CHANGE UP (ref 32–37)
MCV RBC AUTO: 94 FL — SIGNIFICANT CHANGE UP (ref 80–94)
MONOCYTES # BLD AUTO: 0.72 K/UL — HIGH (ref 0.1–0.6)
MONOCYTES NFR BLD AUTO: 8.1 % — SIGNIFICANT CHANGE UP (ref 1.7–9.3)
NEUTROPHILS # BLD AUTO: 5.25 K/UL — SIGNIFICANT CHANGE UP (ref 1.4–6.5)
NEUTROPHILS NFR BLD AUTO: 59.3 % — SIGNIFICANT CHANGE UP (ref 42.2–75.2)
NRBC # BLD: 0 /100 WBCS — SIGNIFICANT CHANGE UP (ref 0–0)
NT-PROBNP SERPL-SCNC: 2361 PG/ML — HIGH (ref 0–300)
PLATELET # BLD AUTO: 212 K/UL — SIGNIFICANT CHANGE UP (ref 130–400)
POTASSIUM SERPL-MCNC: 4.3 MMOL/L — SIGNIFICANT CHANGE UP (ref 3.5–5)
POTASSIUM SERPL-SCNC: 4.3 MMOL/L — SIGNIFICANT CHANGE UP (ref 3.5–5)
PROT SERPL-MCNC: 6.6 G/DL — SIGNIFICANT CHANGE UP (ref 6–8)
RBC # BLD: 3.82 M/UL — LOW (ref 4.7–6.1)
RBC # FLD: 13.6 % — SIGNIFICANT CHANGE UP (ref 11.5–14.5)
SARS-COV-2 RNA SPEC QL NAA+PROBE: SIGNIFICANT CHANGE UP
SODIUM SERPL-SCNC: 138 MMOL/L — SIGNIFICANT CHANGE UP (ref 135–146)
TROPONIN T SERPL-MCNC: <0.01 NG/ML — SIGNIFICANT CHANGE UP
TROPONIN T SERPL-MCNC: <0.01 NG/ML — SIGNIFICANT CHANGE UP
WBC # BLD: 8.86 K/UL — SIGNIFICANT CHANGE UP (ref 4.8–10.8)
WBC # FLD AUTO: 8.86 K/UL — SIGNIFICANT CHANGE UP (ref 4.8–10.8)

## 2023-02-08 PROCEDURE — 76700 US EXAM ABDOM COMPLETE: CPT | Mod: 26

## 2023-02-08 PROCEDURE — 99285 EMERGENCY DEPT VISIT HI MDM: CPT

## 2023-02-08 PROCEDURE — 71045 X-RAY EXAM CHEST 1 VIEW: CPT

## 2023-02-08 PROCEDURE — 93005 ELECTROCARDIOGRAM TRACING: CPT

## 2023-02-08 PROCEDURE — 85025 COMPLETE CBC W/AUTO DIFF WBC: CPT

## 2023-02-08 PROCEDURE — 71045 X-RAY EXAM CHEST 1 VIEW: CPT | Mod: 26

## 2023-02-08 PROCEDURE — 84484 ASSAY OF TROPONIN QUANT: CPT

## 2023-02-08 PROCEDURE — 74174 CTA ABD&PLVS W/CONTRAST: CPT | Mod: 26,MG

## 2023-02-08 PROCEDURE — 83880 ASSAY OF NATRIURETIC PEPTIDE: CPT

## 2023-02-08 PROCEDURE — 71275 CT ANGIOGRAPHY CHEST: CPT | Mod: 26,MG

## 2023-02-08 PROCEDURE — G1004: CPT

## 2023-02-08 PROCEDURE — 80053 COMPREHEN METABOLIC PANEL: CPT

## 2023-02-08 PROCEDURE — 99285 EMERGENCY DEPT VISIT HI MDM: CPT | Mod: 25

## 2023-02-08 PROCEDURE — 76700 US EXAM ABDOM COMPLETE: CPT

## 2023-02-08 PROCEDURE — 83735 ASSAY OF MAGNESIUM: CPT

## 2023-02-08 PROCEDURE — 93010 ELECTROCARDIOGRAM REPORT: CPT

## 2023-02-08 PROCEDURE — 96374 THER/PROPH/DIAG INJ IV PUSH: CPT

## 2023-02-08 PROCEDURE — 71275 CT ANGIOGRAPHY CHEST: CPT | Mod: MG

## 2023-02-08 PROCEDURE — 36415 COLL VENOUS BLD VENIPUNCTURE: CPT

## 2023-02-08 PROCEDURE — 74174 CTA ABD&PLVS W/CONTRAST: CPT | Mod: MG

## 2023-02-08 PROCEDURE — 87635 SARS-COV-2 COVID-19 AMP PRB: CPT

## 2023-02-08 PROCEDURE — 83690 ASSAY OF LIPASE: CPT

## 2023-02-08 RX ORDER — ONDANSETRON 8 MG/1
4 TABLET, FILM COATED ORAL ONCE
Refills: 0 | Status: COMPLETED | OUTPATIENT
Start: 2023-02-08 | End: 2023-02-08

## 2023-02-08 RX ADMIN — ONDANSETRON 4 MILLIGRAM(S): 8 TABLET, FILM COATED ORAL at 18:33

## 2023-02-08 RX ADMIN — Medication 30 MILLILITER(S): at 18:33

## 2023-02-08 NOTE — ED PROVIDER NOTE - PATIENT PORTAL LINK FT
You can access the FollowMyHealth Patient Portal offered by [facility] by registering at the following website: [email or web address]. By joining [facility]’s FollowMyHealth portal, you will also be able to view your health information using other applications (apps) compatible with our system.

## 2023-02-08 NOTE — ED PROVIDER NOTE - CLINICAL SUMMARY MEDICAL DECISION MAKING FREE TEXT BOX
Case signed out to me by Dr. Bray -- patient has extensive hx as noted, here for assessment of acute onset epigastric pain with associated diaphoresis, nausea without vomiting this afternoon.    EKG without acute ischchemia, labs including trop and delta trop negative, elevated BNP.     CTA done to ro aortic dissection is negative. RUQ US without signs of biliary disease.    Patient is well appearing, pain free subjectively and has no abdominal ttp.    Will dc home with close monitoring, return precautions, follow up. Case signed out to me by Dr. Bray -- patient has extensive hx as noted, here for assessment of acute onset epigastric pain with associated diaphoresis, nausea without vomiting this afternoon.    EKG without acute ischchemia, labs including trop and delta trop negative, elevated BNP.     CTA done to ro aortic dissection is negative. RUQ US without signs of biliary disease.    Patient is well appearing, pain free subjectively and has no abdominal ttp.    Will dc home with close monitoring, return precautions, follow up.     presentation is concerning considering his history we obtained EKG per my independent evaluation is not consistent with STEMI we obtained labs including troponin which is negative given this patient's vomiting epigastric pain with diaphoresis we obtained a CTA with a dissection study negative for dissection given his cardiac history I will continue to monitor at this time

## 2023-02-08 NOTE — ED PROVIDER NOTE - OBJECTIVE STATEMENT
Patient is a 89-year-old male history of CAD status post CABG, hypertension, hyperlipidemia, lymphoma, prostate cancer here for evaluation of midsternal chest discomfort that occurred while at rest around 330 this afternoon while eating.  Patient admits to associated diaphoresis nausea and retching.  Patient denies shortness of breath, leg swelling, abdominal pain.

## 2023-02-08 NOTE — ED PROVIDER NOTE - NS ED ATTENDING STATEMENT MOD
This was a shared visit with the ROSENDA. I reviewed and verified the documentation and independently performed the documented:

## 2023-02-08 NOTE — ED PROVIDER NOTE - ATTENDING APP SHARED VISIT CONTRIBUTION OF CARE
Patient is an 89-year-old male past medical history coronary artery disease status post CABG hypertension hyperlipidemia as well as prostate cancer presents for evaluation of midsternal chest pain starting at rest approximately 5 hours prior to arrival with eating notes diaphoresis notes nausea vomiting denies any shortness of breath fevers chills cough abdominal pain or upper back pain    On physical exam patient is normocephalic atraumatic pupils equal round reactive light accommodation extraocular muscles intact oropharynx clear chest clear to auscultation bilaterally abdomen soft tender to palpation in epigastric region however no guarding no rebound lower extremities no edema noted pedal pulse 2+ radial pulses 2+    Assessment plan patient's presentation is concerning considering his history we obtained EKG per my independent evaluation is not consistent with STEMI we obtained labs including troponin which is negative given this patient's vomiting epigastric pain with diaphoresis we obtained a CTA with a dissection study negative for dissection given his cardiac history I will continue to monitor at this time

## 2023-04-19 ENCOUNTER — APPOINTMENT (OUTPATIENT)
Dept: CARDIOLOGY | Facility: CLINIC | Age: 88
End: 2023-04-19
Payer: MEDICARE

## 2023-04-19 VITALS
WEIGHT: 169 LBS | HEIGHT: 64 IN | BODY MASS INDEX: 28.85 KG/M2 | SYSTOLIC BLOOD PRESSURE: 160 MMHG | DIASTOLIC BLOOD PRESSURE: 72 MMHG

## 2023-04-19 PROBLEM — Z78.9 OTHER SPECIFIED HEALTH STATUS: Chronic | Status: ACTIVE | Noted: 2023-02-08

## 2023-04-19 PROCEDURE — 99213 OFFICE O/P EST LOW 20 MIN: CPT

## 2023-04-19 PROCEDURE — 93000 ELECTROCARDIOGRAM COMPLETE: CPT

## 2023-04-19 NOTE — REASON FOR VISIT
[Other: ____] : [unfilled] [FreeTextEntry1] : Diagnostic Tests:\par ---------------------\par EKG:\par 04/19/23-Sinus bradycardia at 53 bpm with 1st degree AVB. Old inferior infarct. \par 01/11/23-Sinus bradycardia at 57 bpm with 1st degree AVB. Old inferior infarct. \par 09/07/22-NSR with 1st degree AVB. PVC. Old inferior infarct. \par 04/13/22-NSR with 1st degree AVB. Old inferior infarct. \par 03/09/22-NSR with 1st degree AVB. Old inferior infarct. \par 08/02/21-NSR with 1st degree AVB. Old inferior infarct. \par ---------------------\par Echo:\par 03/30/2022: EF 60%, Grade III diastolic dysfunction with severely elevated LA pressures, restrictive diastolic function. Mild calcification of AV and MV. Mild to moderate MR. Severe HI. Mild TR. Normal PA pressures.\par 08/02/21-EF normal. Grade I DD. Moderate MAC. PASP 35 mmHg. Dilated ascending aorta, 3.7 cm.

## 2023-04-19 NOTE — ASSESSMENT
[FreeTextEntry1] : Chest pain/CAD: May be anginal in nature. \par - Patient reported an improvement in symptoms after Toprol was increased to 50 gm PO QD \par - Continue Toprol 50mg.\par - If fatigue continues or worsens, will consider decreasing Toprol and trialling long acting nitrates to help with anginal pain. \par - will hold on stress test as patient's symptoms of chest pain improved.  \par - Continue with ASA, statin and benazepril. \par \par HTN: BP at goal per ACC/AHA 2018 guidelines\par - Continue with current meds. \par - Diastolic dysfunction is evident on recent TTE. Blood pressure control is emphasized. \par - BP slightly elevated at office. Per patient, better controlled at home. \par - Will continue to monitor. \par \par HLD: , TG 66, HDL 37, LDL 78 (09/22)\par -Continue with rosuvastatin 20mg PO daily. \par - Blood work (lipid profile etc) was ordered\par - risk factors modification\par \par Right hand swelling: \par -B/L BP without difference.\par -No swelling on exam today. \par -Will monitor for worsening of symptoms.\par -Defer US imaging. \par \par Follow up in 4 months

## 2023-04-19 NOTE — PHYSICAL EXAM
[Well Developed] : well developed [Well Nourished] : well nourished [No Acute Distress] : no acute distress [Normal Conjunctiva] : normal conjunctiva [Normal Venous Pressure] : normal venous pressure [5th Left ICS - MCL] : palpated at the 5th LICS in the midclavicular line [Normal] : normal [No Precordial Heave] : no precordial heave was noted [Normal Rate] : normal [Rhythm Regular] : regular [Normal S1] : normal S1 [Normal S2] : normal S2 [No Murmur] : no murmurs heard [No Pitting Edema] : no pitting edema present [2+] : left 2+ [Clear Lung Fields] : clear lung fields [Good Air Entry] : good air entry [No Respiratory Distress] : no respiratory distress  [Soft] : abdomen soft [Normal Bowel Sounds] : normal bowel sounds [Non Tender] : non-tender [Normal Gait] : normal gait [No Edema] : no edema [No Varicosities] : no varicosities [Moves all extremities] : moves all extremities [Alert and Oriented] : alert and oriented [No Focal Deficits] : no focal deficits [de-identified] : 4-5 small, red plaques on medial aspect of left knee

## 2023-04-19 NOTE — HISTORY OF PRESENT ILLNESS
[FreeTextEntry1] : Mr. Mcmanus is an 87yo M with PMHx of CAD s/p CABG (LIMA-LAD, SVG-D1, PLV and PDA), HTN, HLD, lymphoma and prostate CA who presents for follow up. His PMD is Dr. Selwyn Hebert and previously followed with Dr. Ashkan Moran. Patient complains of some intermittent B/L chest pain. It will only last a few minutes and not always associated with exertion. Denies SOB, palpitations, lightheadedness/dizziness. \par \par Patient is feeling well. He had COVID in December and took Paxlovid. He has some fatigue, but not worse from baseline. He will have intermittent swelling of his right hand when he wakes up, but it will subside after about 30 minutes. \par \par 04/19/23-Patient feeling well. He is looking forward to his grandchildren's weddings over the next few months. Denies CP, SOB, palpitations. \par

## 2023-07-11 ENCOUNTER — OUTPATIENT (OUTPATIENT)
Dept: OUTPATIENT SERVICES | Facility: HOSPITAL | Age: 88
LOS: 1 days | End: 2023-07-11
Payer: MEDICARE

## 2023-07-11 DIAGNOSIS — M10.9 GOUT, UNSPECIFIED: ICD-10-CM

## 2023-07-11 PROCEDURE — 73620 X-RAY EXAM OF FOOT: CPT | Mod: 26,RT

## 2023-07-11 PROCEDURE — 73620 X-RAY EXAM OF FOOT: CPT | Mod: RT

## 2023-07-12 DIAGNOSIS — M10.9 GOUT, UNSPECIFIED: ICD-10-CM

## 2023-07-21 ENCOUNTER — APPOINTMENT (OUTPATIENT)
Dept: UROLOGY | Facility: CLINIC | Age: 88
End: 2023-07-21

## 2023-08-02 ENCOUNTER — LABORATORY RESULT (OUTPATIENT)
Age: 88
End: 2023-08-02

## 2023-08-03 LAB
ALBUMIN SERPL ELPH-MCNC: 3.9 G/DL
ALP BLD-CCNC: 89 U/L
ALT SERPL-CCNC: 11 U/L
ANION GAP SERPL CALC-SCNC: 12 MMOL/L
AST SERPL-CCNC: 19 U/L
BILIRUB SERPL-MCNC: 0.3 MG/DL
BUN SERPL-MCNC: 30 MG/DL
CALCIUM SERPL-MCNC: 8.9 MG/DL
CHLORIDE SERPL-SCNC: 108 MMOL/L
CHOLEST SERPL-MCNC: 146 MG/DL
CO2 SERPL-SCNC: 21 MMOL/L
CREAT SERPL-MCNC: 1.3 MG/DL
EGFR: 53 ML/MIN/1.73M2
ESTIMATED AVERAGE GLUCOSE: 131 MG/DL
GLUCOSE SERPL-MCNC: 111 MG/DL
HBA1C MFR BLD HPLC: 6.2 %
HDLC SERPL-MCNC: 46 MG/DL
LDLC SERPL CALC-MCNC: 88 MG/DL
NONHDLC SERPL-MCNC: 100 MG/DL
POTASSIUM SERPL-SCNC: 4.5 MMOL/L
PROT SERPL-MCNC: 6.1 G/DL
SODIUM SERPL-SCNC: 141 MMOL/L
TRIGL SERPL-MCNC: 59 MG/DL
TSH SERPL-ACNC: 2.25 UIU/ML

## 2023-08-08 ENCOUNTER — APPOINTMENT (OUTPATIENT)
Dept: UROLOGY | Facility: CLINIC | Age: 88
End: 2023-08-08
Payer: MEDICARE

## 2023-08-08 VITALS
DIASTOLIC BLOOD PRESSURE: 87 MMHG | TEMPERATURE: 97.9 F | HEART RATE: 52 BPM | WEIGHT: 155 LBS | SYSTOLIC BLOOD PRESSURE: 170 MMHG | HEIGHT: 64 IN | BODY MASS INDEX: 26.46 KG/M2

## 2023-08-08 PROCEDURE — 99213 OFFICE O/P EST LOW 20 MIN: CPT

## 2023-08-08 NOTE — HISTORY OF PRESENT ILLNESS
[FreeTextEntry1] : 89-year-old with history of prostate cancer status post radiation therapy.  The cancer was diagnosed incidentally during a suprapubic prostatectomy for urinary retention.  PSA last year 2022 was 0.24 ng/mL.  He currently has no urinary complaints.  No bowel complaints.  Reports feeling well.

## 2023-08-08 NOTE — ASSESSMENT
[FreeTextEntry1] : No clinical signs of prostate cancer recurrence. PSA ordered.  Patient will go to lab to get blood drawn and call for results.  Follow-up 1 year

## 2023-08-08 NOTE — END OF VISIT
[FreeTextEntry3] : I participated in obtaining the history, discussed treatment plan with the patient agree with the above transcription by the physicians assistant

## 2023-08-15 LAB — PSA SERPL-MCNC: 0.29 NG/ML

## 2023-08-23 ENCOUNTER — APPOINTMENT (OUTPATIENT)
Dept: CARDIOLOGY | Facility: CLINIC | Age: 88
End: 2023-08-23
Payer: MEDICARE

## 2023-08-23 VITALS
DIASTOLIC BLOOD PRESSURE: 80 MMHG | WEIGHT: 171 LBS | HEIGHT: 64 IN | HEART RATE: 69 BPM | BODY MASS INDEX: 29.19 KG/M2 | SYSTOLIC BLOOD PRESSURE: 152 MMHG

## 2023-08-23 DIAGNOSIS — Q24.5 MALFORMATION OF CORONARY VESSELS: ICD-10-CM

## 2023-08-23 PROCEDURE — 99213 OFFICE O/P EST LOW 20 MIN: CPT

## 2023-08-23 PROCEDURE — 93000 ELECTROCARDIOGRAM COMPLETE: CPT

## 2023-08-23 NOTE — REASON FOR VISIT
[Other: ____] : [unfilled] [FreeTextEntry1] : Diagnostic Tests: --------------------- EK23-NSR with 1st degree AVB. LVH. Old inferior infarct.  23-Sinus bradycardia at 53 bpm with 1st degree AVB. Old inferior infarct.  23-Sinus bradycardia at 57 bpm with 1st degree AVB. Old inferior infarct.  22-NSR with 1st degree AVB. PVC. Old inferior infarct.  22-NSR with 1st degree AVB. Old inferior infarct.  22-NSR with 1st degree AVB. Old inferior infarct.  21-NSR with 1st degree AVB. Old inferior infarct.  --------------------- Echo: 2022: EF 60%, Grade III diastolic dysfunction with severely elevated LA pressures, restrictive diastolic function. Mild calcification of AV and MV. Mild to moderate MR. Severe [place]. Mild TR. Normal PA pressures. 21-EF normal. Grade I DD. Moderate MAC. PASP 35 mmHg. Dilated ascending aorta, 3.7 cm.

## 2023-08-23 NOTE — PHYSICAL EXAM
[Well Developed] : well developed [Well Nourished] : well nourished [No Acute Distress] : no acute distress [Normal Venous Pressure] : normal venous pressure [Normal Conjunctiva] : normal conjunctiva [5th Left ICS - MCL] : palpated at the 5th LICS in the midclavicular line [Normal] : normal [No Precordial Heave] : no precordial heave was noted [Rhythm Regular] : regular [Normal Rate] : normal [Normal S1] : normal S1 [Normal S2] : normal S2 [No Murmur] : no murmurs heard [No Pitting Edema] : no pitting edema present [2+] : left 2+ [Clear Lung Fields] : clear lung fields [Good Air Entry] : good air entry [No Respiratory Distress] : no respiratory distress  [Soft] : abdomen soft [Non Tender] : non-tender [Normal Bowel Sounds] : normal bowel sounds [Normal Gait] : normal gait [No Edema] : no edema [No Varicosities] : no varicosities [Moves all extremities] : moves all extremities [No Focal Deficits] : no focal deficits [Alert and Oriented] : alert and oriented [de-identified] : No evidence of inguinal hernia or other mass.  [de-identified] : 4-5 small, red plaques on medial aspect of left knee

## 2023-08-23 NOTE — HISTORY OF PRESENT ILLNESS
[FreeTextEntry1] : Mr. Mcmanus is an 89yo M with PMHx of CAD s/p CABG (LIMA-LAD, SVG-D1, PLV and PDA), HTN, HLD, lymphoma and prostate CA who presents for follow up. His PMD is Dr. Selwyn Hebert and previously followed with Dr. Ashkan Moran. Patient complains of some intermittent B/L chest pain. It will only last a few minutes and not always associated with exertion. Denies SOB, palpitations, lightheadedness/dizziness.   Patient is feeling well. He had COVID in December and took Paxlovid. He has some fatigue, but not worse from baseline. He will have intermittent swelling of his right hand when he wakes up, but it will subside after about 30 minutes.   04/19/23-Patient feeling well. He is looking forward to his grandchildren's weddings over the next few months. Denies CP, SOB, palpitations.  08/23/23-He is feeling well. He had some right groin pain after getting out of his car. No radiation or hx of hernia.

## 2023-08-23 NOTE — ASSESSMENT
[FreeTextEntry1] : Chest pain/CAD: May be anginal in nature.  - Continue Toprol 50mg. - If fatigue continues or worsens, will consider decreasing Toprol and trialling long acting nitrates to help with anginal pain.  - will hold on stress test as patient's symptoms of chest pain improved.   - Continue with ASA, statin and benazepril.   HTN: BP at goal per ACC/AHA 2018 guidelines - Continue with current meds.  - Diastolic dysfunction is evident on recent TTE. Blood pressure control is emphasized.  - BP slightly elevated at office. Per patient, better controlled at home.  - Will continue to monitor.   HLD: , TG 66, HDL 37, LDL 78 (09/22) -Continue with rosuvastatin 20mg PO daily.  - Blood work (lipid profile etc) was ordered - risk factors modification  Right hand swelling:  -B/L BP without difference. -No swelling on exam today.  -Will monitor for worsening of symptoms. -Defer US imaging.   Follow up in 4 months

## 2023-11-08 ENCOUNTER — RESULT CHARGE (OUTPATIENT)
Age: 88
End: 2023-11-08

## 2023-11-08 ENCOUNTER — APPOINTMENT (OUTPATIENT)
Dept: ORTHOPEDIC SURGERY | Facility: CLINIC | Age: 88
End: 2023-11-08
Payer: MEDICARE

## 2023-11-08 ENCOUNTER — APPOINTMENT (OUTPATIENT)
Dept: ORTHOPEDIC SURGERY | Facility: CLINIC | Age: 88
End: 2023-11-08

## 2023-11-08 VITALS — WEIGHT: 171 LBS | HEIGHT: 64 IN | BODY MASS INDEX: 29.19 KG/M2

## 2023-11-08 PROCEDURE — 73610 X-RAY EXAM OF ANKLE: CPT | Mod: RT

## 2023-11-08 PROCEDURE — 20610 DRAIN/INJ JOINT/BURSA W/O US: CPT | Mod: LT

## 2023-11-08 PROCEDURE — 73562 X-RAY EXAM OF KNEE 3: CPT | Mod: LT

## 2023-11-08 PROCEDURE — 99203 OFFICE O/P NEW LOW 30 MIN: CPT | Mod: 25

## 2023-11-30 ENCOUNTER — APPOINTMENT (OUTPATIENT)
Dept: ORTHOPEDIC SURGERY | Facility: CLINIC | Age: 88
End: 2023-11-30
Payer: MEDICARE

## 2023-11-30 DIAGNOSIS — M25.471 EFFUSION, RIGHT ANKLE: ICD-10-CM

## 2023-11-30 PROCEDURE — 99203 OFFICE O/P NEW LOW 30 MIN: CPT

## 2023-11-30 PROCEDURE — 99213 OFFICE O/P EST LOW 20 MIN: CPT

## 2023-12-03 ENCOUNTER — RX RENEWAL (OUTPATIENT)
Age: 88
End: 2023-12-03

## 2023-12-03 RX ORDER — METOPROLOL SUCCINATE 50 MG/1
50 TABLET, EXTENDED RELEASE ORAL DAILY
Qty: 90 | Refills: 3 | Status: ACTIVE | COMMUNITY
Start: 2022-03-09 | End: 1900-01-01

## 2023-12-14 ENCOUNTER — APPOINTMENT (OUTPATIENT)
Dept: CARDIOLOGY | Facility: CLINIC | Age: 88
End: 2023-12-14
Payer: MEDICARE

## 2023-12-14 VITALS — DIASTOLIC BLOOD PRESSURE: 68 MMHG | SYSTOLIC BLOOD PRESSURE: 122 MMHG

## 2023-12-14 VITALS — BODY MASS INDEX: 27.49 KG/M2 | TEMPERATURE: 97.6 F | WEIGHT: 161 LBS | HEIGHT: 64 IN

## 2023-12-14 VITALS — HEART RATE: 63 BPM | DIASTOLIC BLOOD PRESSURE: 80 MMHG | SYSTOLIC BLOOD PRESSURE: 140 MMHG

## 2023-12-14 DIAGNOSIS — R53.83 OTHER FATIGUE: ICD-10-CM

## 2023-12-14 PROCEDURE — 93000 ELECTROCARDIOGRAM COMPLETE: CPT

## 2023-12-14 PROCEDURE — 99214 OFFICE O/P EST MOD 30 MIN: CPT

## 2023-12-14 RX ORDER — METHYLPREDNISOLONE 4 MG/1
4 TABLET ORAL
Qty: 21 | Refills: 0 | Status: COMPLETED | COMMUNITY
Start: 2023-11-08 | End: 2023-12-14

## 2023-12-14 RX ORDER — FLUTICASONE PROPIONATE 50 UG/1
50 SPRAY, METERED NASAL DAILY
Qty: 3 | Refills: 3 | Status: ACTIVE | COMMUNITY
Start: 2023-12-14 | End: 1900-01-01

## 2023-12-14 NOTE — PHYSICAL EXAM
[Well Developed] : well developed [Well Nourished] : well nourished [No Acute Distress] : no acute distress [Normal Conjunctiva] : normal conjunctiva [Normal Venous Pressure] : normal venous pressure [5th Left ICS - MCL] : palpated at the 5th LICS in the midclavicular line [Normal] : normal [No Precordial Heave] : no precordial heave was noted [Normal Rate] : normal [Rhythm Regular] : regular [Normal S1] : normal S1 [Normal S2] : normal S2 [No Murmur] : no murmurs heard [No Pitting Edema] : no pitting edema present [2+] : left 2+ [Clear Lung Fields] : clear lung fields [Good Air Entry] : good air entry [No Respiratory Distress] : no respiratory distress  [Soft] : abdomen soft [Non Tender] : non-tender [Normal Bowel Sounds] : normal bowel sounds [Normal Gait] : normal gait [No Edema] : no edema [No Varicosities] : no varicosities [Moves all extremities] : moves all extremities [No Focal Deficits] : no focal deficits [Alert and Oriented] : alert and oriented [de-identified] : No evidence of inguinal hernia or other mass.  [de-identified] : 4-5 small, red plaques on medial aspect of left knee

## 2023-12-14 NOTE — ASSESSMENT
[FreeTextEntry1] : Chest pain/CAD: May be anginal in nature. - Continue Toprol 50mg. - If fatigue continues or worsens, will consider decreasing Toprol and trialling long acting nitrates to help with anginal pain. - will hold on stress test as patient's symptoms of chest pain improved. - Continue with ASA, statin and benazepril.  HTN: BP at goal per ACC/AHA 2018 guidelines - Continue with current meds. - Diastolic dysfunction is evident on recent TTE. Blood pressure control is emphasized. - BP slightly elevated at office. Per patient, better controlled at home. - Will continue to monitor.  HLD: , TG 66, HDL 37, LDL 78->, TG 59, HDL 46, LDL 88 (08/23) -Continue with rosuvastatin 20mg PO daily. - Blood work (lipid profile etc) was ordered - risk factors modification  Right hand swelling: -B/L BP without difference. -No swelling on exam today. -Will monitor for worsening of symptoms. -Defer US imaging.  Nasal congestion:  -Flonase.   Follow up in 4 months.

## 2023-12-14 NOTE — REASON FOR VISIT
[Other: ____] : [unfilled] [FreeTextEntry1] : Diagnostic Tests: --------------------- EK23-NSR with 1st degree AVB. PACs. Old inferior infarct.  23-NSR with 1st degree AVB. LVH. Old inferior infarct.  23-Sinus bradycardia at 53 bpm with 1st degree AVB. Old inferior infarct.  23-Sinus bradycardia at 57 bpm with 1st degree AVB. Old inferior infarct.  22-NSR with 1st degree AVB. PVC. Old inferior infarct.  22-NSR with 1st degree AVB. Old inferior infarct.  22-NSR with 1st degree AVB. Old inferior infarct.  21-NSR with 1st degree AVB. Old inferior infarct.  --------------------- Echo: 2022: EF 60%, Grade III diastolic dysfunction with severely elevated LA pressures, restrictive diastolic function. Mild calcification of AV and MV. Mild to moderate MR. Severe [place]. Mild TR. Normal PA pressures. 21-EF normal. Grade I DD. Moderate MAC. PASP 35 mmHg. Dilated ascending aorta, 3.7 cm.

## 2023-12-14 NOTE — HISTORY OF PRESENT ILLNESS
Patient was not available for their therapy session at this time.  Reason not seen: Other health personnel with patient (07/17/17 1717).  Re-Attempt Plan: Will re-attempt per established treatment plan (07/17/17 1717).    
[FreeTextEntry1] : Mr. Mcmanus is an 87yo M with PMHx of CAD s/p CABG (LIMA-LAD, SVG-D1, PLV and PDA), HTN, HLD, lymphoma and prostate CA who presents for follow up. His PMD is Dr. Selwyn Hebert and previously followed with Dr. Ashkan Moran. Patient complains of some intermittent B/L chest pain. It will only last a few minutes and not always associated with exertion. Denies SOB, palpitations, lightheadedness/dizziness.   Patient is feeling well. He had COVID in December and took Paxlovid. He has some fatigue, but not worse from baseline. He will have intermittent swelling of his right hand when he wakes up, but it will subside after about 30 minutes.   04/19/23-Patient feeling well. He is looking forward to his grandchildren's weddings over the next few months. Denies CP, SOB, palpitations.  08/23/23-He is feeling well. He had some right groin pain after getting out of his car. No radiation or hx of hernia.  12/14/23-He is doing well. Recently had 89th birthday party. He has been having some nasal congestion and PND. 
denies

## 2024-03-15 ENCOUNTER — RX RENEWAL (OUTPATIENT)
Age: 89
End: 2024-03-15

## 2024-03-15 RX ORDER — ROSUVASTATIN CALCIUM 20 MG/1
20 TABLET, FILM COATED ORAL
Qty: 90 | Refills: 3 | Status: ACTIVE | COMMUNITY
Start: 2020-12-18 | End: 1900-01-01

## 2024-04-18 ENCOUNTER — APPOINTMENT (OUTPATIENT)
Dept: CARDIOLOGY | Facility: CLINIC | Age: 89
End: 2024-04-18
Payer: MEDICARE

## 2024-04-18 VITALS — HEART RATE: 52 BPM | SYSTOLIC BLOOD PRESSURE: 150 MMHG | DIASTOLIC BLOOD PRESSURE: 80 MMHG

## 2024-04-18 VITALS — BODY MASS INDEX: 27.49 KG/M2 | HEIGHT: 64 IN | TEMPERATURE: 97.6 F | WEIGHT: 161 LBS

## 2024-04-18 VITALS — DIASTOLIC BLOOD PRESSURE: 68 MMHG | SYSTOLIC BLOOD PRESSURE: 134 MMHG

## 2024-04-18 DIAGNOSIS — C61 MALIGNANT NEOPLASM OF PROSTATE: ICD-10-CM

## 2024-04-18 DIAGNOSIS — E78.5 HYPERLIPIDEMIA, UNSPECIFIED: ICD-10-CM

## 2024-04-18 DIAGNOSIS — I25.10 ATHEROSCLEROTIC HEART DISEASE OF NATIVE CORONARY ARTERY W/OUT ANGINA PECTORIS: ICD-10-CM

## 2024-04-18 DIAGNOSIS — M17.12 UNILATERAL PRIMARY OSTEOARTHRITIS, LEFT KNEE: ICD-10-CM

## 2024-04-18 DIAGNOSIS — R09.81 NASAL CONGESTION: ICD-10-CM

## 2024-04-18 DIAGNOSIS — I10 ESSENTIAL (PRIMARY) HYPERTENSION: ICD-10-CM

## 2024-04-18 DIAGNOSIS — C85.90 NON-HODGKIN LYMPHOMA, UNSPECIFIED, UNSPECIFIED SITE: ICD-10-CM

## 2024-04-18 PROCEDURE — G2211 COMPLEX E/M VISIT ADD ON: CPT

## 2024-04-18 PROCEDURE — 99214 OFFICE O/P EST MOD 30 MIN: CPT

## 2024-04-18 PROCEDURE — 93000 ELECTROCARDIOGRAM COMPLETE: CPT

## 2024-04-18 RX ORDER — METHYLPREDNISOLONE 4 MG/1
4 TABLET ORAL
Qty: 21 | Refills: 0 | Status: COMPLETED | COMMUNITY
Start: 2023-11-09 | End: 2024-04-18

## 2024-04-18 NOTE — ASSESSMENT
[FreeTextEntry1] : Chest pain/CAD: May be anginal in nature. Now resolved.  - Continue Toprol 50mg. - If fatigue continues or worsens, will consider decreasing Toprol and trialling long acting nitrates to help with anginal pain. - will hold on stress test as patient's symptoms of chest pain improved. - Continue with ASA, statin and benazepril.  HTN: BP at goal per ACC/AHA 2018 guidelines - Continue with current meds. - Diastolic dysfunction is evident on recent TTE. Blood pressure control is emphasized. - BP slightly elevated at office. Per patient, better controlled at home. - Will continue to monitor.  HLD: , TG 66, HDL 37, LDL 78->, TG 59, HDL 46, LDL 88 (08/23) -Continue with rosuvastatin 20mg PO daily. - Blood work (lipid profile etc) was ordered - risk factors modification  Right hand swelling: -B/L BP without difference. -No swelling on exam today. -Will monitor for worsening of symptoms. -Defer US imaging.  Nasal congestion:  -Flonase, nasal saline.   Follow up in 4 months.

## 2024-04-18 NOTE — PHYSICAL EXAM
[Well Developed] : well developed [Well Nourished] : well nourished [No Acute Distress] : no acute distress [Normal Conjunctiva] : normal conjunctiva [Normal Venous Pressure] : normal venous pressure [5th Left ICS - MCL] : palpated at the 5th LICS in the midclavicular line [Normal] : normal [No Precordial Heave] : no precordial heave was noted [Normal Rate] : normal [Rhythm Regular] : regular [Normal S1] : normal S1 [Normal S2] : normal S2 [No Murmur] : no murmurs heard [No Pitting Edema] : no pitting edema present [2+] : left 2+ [Clear Lung Fields] : clear lung fields [Good Air Entry] : good air entry [No Respiratory Distress] : no respiratory distress  [Soft] : abdomen soft [Non Tender] : non-tender [Normal Bowel Sounds] : normal bowel sounds [Normal Gait] : normal gait [No Edema] : no edema [No Varicosities] : no varicosities [Moves all extremities] : moves all extremities [No Focal Deficits] : no focal deficits [Alert and Oriented] : alert and oriented [de-identified] : No evidence of inguinal hernia or other mass.  [de-identified] : 4-5 small, red plaques on medial aspect of left knee

## 2024-04-18 NOTE — HISTORY OF PRESENT ILLNESS
[FreeTextEntry1] : Mr. Mcmanus is an 89yo M with PMHx of CAD s/p CABG (LIMA-LAD, SVG-D1, PLV and PDA), HTN, HLD, lymphoma and prostate CA who presents for follow up. His PMD is Dr. Selwyn Hebert and previously followed with Dr. Ashkan Moran. Patient complains of some intermittent B/L chest pain. It will only last a few minutes and not always associated with exertion. Denies SOB, palpitations, lightheadedness/dizziness.   Patient is feeling well. He had COVID in December and took Paxlovid. He has some fatigue, but not worse from baseline. He will have intermittent swelling of his right hand when he wakes up, but it will subside after about 30 minutes.   04/19/23-Patient feeling well. He is looking forward to his grandchildren's weddings over the next few months. Denies CP, SOB, palpitations.  08/23/23-He is feeling well. He had some right groin pain after getting out of his car. No radiation or hx of hernia.  12/14/23-He is doing well. Recently had 89th birthday party. He has been having some nasal congestion and PND.  04/18/24-Patient with nasal congestion. He feels well overall.

## 2024-04-18 NOTE — REASON FOR VISIT
[Other: ____] : [unfilled] [FreeTextEntry1] : Diagnostic Tests: --------------------- EK24-NSR with 1st degree AVB. PAC/PVCs. Inferior infarct.  23-NSR with 1st degree AVB. PACs. Old inferior infarct.  23-NSR with 1st degree AVB. LVH. Old inferior infarct.  23-Sinus bradycardia at 53 bpm with 1st degree AVB. Old inferior infarct.  23-Sinus bradycardia at 57 bpm with 1st degree AVB. Old inferior infarct.  22-NSR with 1st degree AVB. PVC. Old inferior infarct.  22-NSR with 1st degree AVB. Old inferior infarct.  22-NSR with 1st degree AVB. Old inferior infarct.  21-NSR with 1st degree AVB. Old inferior infarct.  --------------------- Echo: 2022: EF 60%, Grade III diastolic dysfunction with severely elevated LA pressures, restrictive diastolic function. Mild calcification of AV and MV. Mild to moderate MR. Severe [place]. Mild TR. Normal PA pressures. 21-EF normal. Grade I DD. Moderate MAC. PASP 35 mmHg. Dilated ascending aorta, 3.7 cm.

## 2024-07-24 LAB
ALBUMIN SERPL ELPH-MCNC: 4.1 G/DL
ALP BLD-CCNC: 106 U/L
ALT SERPL-CCNC: 11 U/L
ANION GAP SERPL CALC-SCNC: 12 MMOL/L
AST SERPL-CCNC: 21 U/L
BASOPHILS # BLD AUTO: 0.1 K/UL
BASOPHILS NFR BLD AUTO: 1.1 %
BILIRUB SERPL-MCNC: 0.2 MG/DL
BUN SERPL-MCNC: 28 MG/DL
CALCIUM SERPL-MCNC: 9.3 MG/DL
CHLORIDE SERPL-SCNC: 106 MMOL/L
CHOLEST SERPL-MCNC: 177 MG/DL
CO2 SERPL-SCNC: 23 MMOL/L
CREAT SERPL-MCNC: 1.3 MG/DL
EGFR: 52 ML/MIN/1.73M2
EOSINOPHIL # BLD AUTO: 1.72 K/UL
EOSINOPHIL NFR BLD AUTO: 18.7 %
GLUCOSE SERPL-MCNC: 112 MG/DL
HCT VFR BLD CALC: 38.8 %
HDLC SERPL-MCNC: 40 MG/DL
HGB BLD-MCNC: 12.1 G/DL
IMM GRANULOCYTES NFR BLD AUTO: 0.4 %
LDLC SERPL CALC-MCNC: 123 MG/DL
LYMPHOCYTES # BLD AUTO: 3.05 K/UL
LYMPHOCYTES NFR BLD AUTO: 33.2 %
MAN DIFF?: NORMAL
MCHC RBC-ENTMCNC: 30.2 PG
MCHC RBC-ENTMCNC: 31.2 G/DL
MCV RBC AUTO: 96.8 FL
MONOCYTES # BLD AUTO: 0.69 K/UL
MONOCYTES NFR BLD AUTO: 7.5 %
NEUTROPHILS # BLD AUTO: 3.6 K/UL
NEUTROPHILS NFR BLD AUTO: 39.1 %
NONHDLC SERPL-MCNC: 137 MG/DL
PLATELET # BLD AUTO: 257 K/UL
PMV BLD AUTO: 0 /100 WBCS
POTASSIUM SERPL-SCNC: 4.5 MMOL/L
PROT SERPL-MCNC: 6.4 G/DL
RBC # BLD: 4.01 M/UL
RBC # FLD: 13.5 %
SODIUM SERPL-SCNC: 141 MMOL/L
TRIGL SERPL-MCNC: 72 MG/DL
TSH SERPL-ACNC: 3.86 UIU/ML
WBC # FLD AUTO: 9.2 K/UL

## 2024-07-25 LAB
ESTIMATED AVERAGE GLUCOSE: 140 MG/DL
HBA1C MFR BLD HPLC: 6.5 %

## 2024-08-13 ENCOUNTER — APPOINTMENT (OUTPATIENT)
Dept: UROLOGY | Facility: CLINIC | Age: 89
End: 2024-08-13
Payer: MEDICARE

## 2024-08-13 VITALS
OXYGEN SATURATION: 97 % | DIASTOLIC BLOOD PRESSURE: 60 MMHG | BODY MASS INDEX: 27.49 KG/M2 | SYSTOLIC BLOOD PRESSURE: 186 MMHG | WEIGHT: 161 LBS | RESPIRATION RATE: 16 BRPM | HEART RATE: 69 BPM | HEIGHT: 64 IN | TEMPERATURE: 97.4 F

## 2024-08-13 LAB
BILIRUB UR QL STRIP: NORMAL
COLLECTION METHOD: NORMAL
GLUCOSE UR-MCNC: NORMAL
HCG UR QL: 0.2 EU/DL
HGB UR QL STRIP.AUTO: NORMAL
KETONES UR-MCNC: NORMAL
LEUKOCYTE ESTERASE UR QL STRIP: NORMAL
NITRITE UR QL STRIP: NORMAL
PH UR STRIP: 5.5
PROT UR STRIP-MCNC: NORMAL
SP GR UR STRIP: 1.02

## 2024-08-13 PROCEDURE — G2211 COMPLEX E/M VISIT ADD ON: CPT

## 2024-08-13 PROCEDURE — 99212 OFFICE O/P EST SF 10 MIN: CPT

## 2024-08-13 NOTE — HISTORY OF PRESENT ILLNESS
[FreeTextEntry1] : 89-year-old with history of prostate cancer postradiation therapy.  The cancer was diagnosed incidentally during suprapubic prostatectomy for urinary retention.  Current PSA 0.29 stable.  No back pain, no bone pain, no bowel complaint, no urinary complaint.

## 2024-08-21 ENCOUNTER — APPOINTMENT (OUTPATIENT)
Dept: CARDIOLOGY | Facility: CLINIC | Age: 89
End: 2024-08-21
Payer: MEDICARE

## 2024-08-21 VITALS — HEIGHT: 64 IN | BODY MASS INDEX: 28.34 KG/M2 | WEIGHT: 166 LBS

## 2024-08-21 VITALS — DIASTOLIC BLOOD PRESSURE: 70 MMHG | SYSTOLIC BLOOD PRESSURE: 170 MMHG | HEART RATE: 62 BPM

## 2024-08-21 DIAGNOSIS — C85.90 NON-HODGKIN LYMPHOMA, UNSPECIFIED, UNSPECIFIED SITE: ICD-10-CM

## 2024-08-21 DIAGNOSIS — I25.10 ATHEROSCLEROTIC HEART DISEASE OF NATIVE CORONARY ARTERY W/OUT ANGINA PECTORIS: ICD-10-CM

## 2024-08-21 DIAGNOSIS — I10 ESSENTIAL (PRIMARY) HYPERTENSION: ICD-10-CM

## 2024-08-21 DIAGNOSIS — R09.81 NASAL CONGESTION: ICD-10-CM

## 2024-08-21 DIAGNOSIS — C61 MALIGNANT NEOPLASM OF PROSTATE: ICD-10-CM

## 2024-08-21 DIAGNOSIS — R60.0 LOCALIZED EDEMA: ICD-10-CM

## 2024-08-21 DIAGNOSIS — E78.5 HYPERLIPIDEMIA, UNSPECIFIED: ICD-10-CM

## 2024-08-21 PROCEDURE — G2211 COMPLEX E/M VISIT ADD ON: CPT

## 2024-08-21 PROCEDURE — 99214 OFFICE O/P EST MOD 30 MIN: CPT

## 2024-08-21 PROCEDURE — 93000 ELECTROCARDIOGRAM COMPLETE: CPT

## 2024-08-21 RX ORDER — FUROSEMIDE 20 MG/1
20 TABLET ORAL
Qty: 30 | Refills: 2 | Status: ACTIVE | COMMUNITY
Start: 2024-08-21 | End: 1900-01-01

## 2024-08-21 NOTE — ASSESSMENT
[FreeTextEntry1] : Chest pain/CAD: May be anginal in nature. Now resolved.  - Continue Toprol 50mg. - If fatigue continues or worsens, will consider decreasing Toprol and trialling long acting nitrates to help with anginal pain. - will hold on stress test as patient's symptoms of chest pain improved. - Continue with ASA, statin and benazepril.  HTN: BP at goal per ACC/AHA 2018 guidelines - Continue with current meds. - Diastolic dysfunction is evident on recent TTE. Blood pressure control is emphasized. - BP slightly elevated at office. Per patient, better controlled at home. - Will continue to monitor.  HLD: , TG 66, HDL 37, LDL 78->, TG 59, HDL 46, LDL 88 (08/23)->, TG 72, HDL 40,  (07/24) -Continue with rosuvastatin 20mg PO daily. - Blood work (lipid profile etc) was ordered - risk factors modification  DM: HA1c 6.5% (07/24) -Lifestyle changes.   Right hand swelling: -B/L BP without difference. -No swelling on exam today. -Will monitor for worsening of symptoms. -Defer US imaging.  Nasal congestion:  -Flonase, nasal saline.   LLE swelling:  -Lasix 20mg PO PRN for swelling. -Check US LE Venous.  -Will call to check on swelling.   Follow up in 4-6 weeks.

## 2024-08-21 NOTE — HISTORY OF PRESENT ILLNESS
[FreeTextEntry1] : Mr. Mcmanus is an 89yo M with PMHx of CAD s/p CABG (LIMA-LAD, SVG-D1, PLV and PDA), HTN, HLD, lymphoma and prostate CA who presents for follow up. His PMD is Dr. Selwyn Hebert and previously followed with Dr. Ashkan Moran. Patient complains of some intermittent B/L chest pain. It will only last a few minutes and not always associated with exertion. Denies SOB, palpitations, lightheadedness/dizziness.   Patient is feeling well. He had COVID in December and took Paxlovid. He has some fatigue, but not worse from baseline. He will have intermittent swelling of his right hand when he wakes up, but it will subside after about 30 minutes.   04/19/23-Patient feeling well. He is looking forward to his grandchildren's weddings over the next few months. Denies CP, SOB, palpitations.  08/23/23-He is feeling well. He had some right groin pain after getting out of his car. No radiation or hx of hernia.  12/14/23-He is doing well. Recently had 89th birthday party. He has been having some nasal congestion and PND.  04/18/24-Patient with nasal congestion. He feels well overall.  08/21/24-Patient has been having LLE swelling. He denies any trauma to the area. He has tried compression stockings.

## 2024-08-21 NOTE — REASON FOR VISIT
[Other: ____] : [unfilled] [FreeTextEntry1] : Diagnostic Tests: --------------------- EK24-NSR with 1st degree AVB. PAC. Inferior infarct.  24-NSR with 1st degree AVB. PAC/PVCs. Inferior infarct.  23-NSR with 1st degree AVB. PACs. Old inferior infarct.  23-NSR with 1st degree AVB. LVH. Old inferior infarct.  23-Sinus bradycardia at 53 bpm with 1st degree AVB. Old inferior infarct.  23-Sinus bradycardia at 57 bpm with 1st degree AVB. Old inferior infarct.  22-NSR with 1st degree AVB. PVC. Old inferior infarct.  22-NSR with 1st degree AVB. Old inferior infarct.  22-NSR with 1st degree AVB. Old inferior infarct.  21-NSR with 1st degree AVB. Old inferior infarct.  --------------------- Echo: 2022: EF 60%, Grade III diastolic dysfunction with severely elevated LA pressures, restrictive diastolic function. Mild calcification of AV and MV. Mild to moderate MR. Severe [place]. Mild TR. Normal PA pressures. 21-EF normal. Grade I DD. Moderate MAC. PASP 35 mmHg. Dilated ascending aorta, 3.7 cm.

## 2024-08-21 NOTE — REVIEW OF SYSTEMS
[Negative] : Heme/Lymph [Lower Ext Edema] : lower extremity edema [SOB] : no shortness of breath [Dyspnea on exertion] : not dyspnea during exertion [Chest Discomfort] : no chest discomfort

## 2024-09-09 ENCOUNTER — APPOINTMENT (OUTPATIENT)
Dept: CARDIOLOGY | Facility: CLINIC | Age: 89
End: 2024-09-09
Payer: MEDICARE

## 2024-09-09 PROCEDURE — 93970 EXTREMITY STUDY: CPT

## 2024-09-18 ENCOUNTER — APPOINTMENT (OUTPATIENT)
Dept: CARDIOLOGY | Facility: CLINIC | Age: 89
End: 2024-09-18
Payer: MEDICARE

## 2024-09-18 VITALS
HEIGHT: 64 IN | DIASTOLIC BLOOD PRESSURE: 80 MMHG | WEIGHT: 163 LBS | BODY MASS INDEX: 27.83 KG/M2 | SYSTOLIC BLOOD PRESSURE: 148 MMHG

## 2024-09-18 DIAGNOSIS — R60.0 LOCALIZED EDEMA: ICD-10-CM

## 2024-09-18 DIAGNOSIS — C61 MALIGNANT NEOPLASM OF PROSTATE: ICD-10-CM

## 2024-09-18 DIAGNOSIS — E78.5 HYPERLIPIDEMIA, UNSPECIFIED: ICD-10-CM

## 2024-09-18 DIAGNOSIS — I10 ESSENTIAL (PRIMARY) HYPERTENSION: ICD-10-CM

## 2024-09-18 DIAGNOSIS — Q24.5 MALFORMATION OF CORONARY VESSELS: ICD-10-CM

## 2024-09-18 DIAGNOSIS — C85.90 NON-HODGKIN LYMPHOMA, UNSPECIFIED, UNSPECIFIED SITE: ICD-10-CM

## 2024-09-18 DIAGNOSIS — I25.10 ATHEROSCLEROTIC HEART DISEASE OF NATIVE CORONARY ARTERY W/OUT ANGINA PECTORIS: ICD-10-CM

## 2024-09-18 PROCEDURE — G2211 COMPLEX E/M VISIT ADD ON: CPT

## 2024-09-18 PROCEDURE — 99214 OFFICE O/P EST MOD 30 MIN: CPT

## 2024-09-18 NOTE — REVIEW OF SYSTEMS
[Lower Ext Edema] : lower extremity edema [Negative] : Heme/Lymph [SOB] : no shortness of breath [Dyspnea on exertion] : not dyspnea during exertion [Chest Discomfort] : no chest discomfort Elidel Counseling: Patient may experience a mild burning sensation during topical application. Elidel is not approved in children less than 2 years of age. There have been case reports of hematologic and skin malignancies in patients using topical calcineurin inhibitors although causality is questionable.

## 2024-09-18 NOTE — HISTORY OF PRESENT ILLNESS
[FreeTextEntry1] : Mr. Mcmanus is an 89yo M with PMHx of CAD s/p CABG (LIMA-LAD, SVG-D1, PLV and PDA), HTN, HLD, lymphoma and prostate CA who presents for follow up. His PMD is Dr. Selwyn Hebert and previously followed with Dr. Ashkan Moran. Patient complains of some intermittent B/L chest pain. It will only last a few minutes and not always associated with exertion. Denies SOB, palpitations, lightheadedness/dizziness.   Patient is feeling well. He had COVID in December and took Paxlovid. He has some fatigue, but not worse from baseline. He will have intermittent swelling of his right hand when he wakes up, but it will subside after about 30 minutes.   04/19/23-Patient feeling well. He is looking forward to his grandchildren's weddings over the next few months. Denies CP, SOB, palpitations.  08/23/23-He is feeling well. He had some right groin pain after getting out of his car. No radiation or hx of hernia.  12/14/23-He is doing well. Recently had 89th birthday party. He has been having some nasal congestion and PND.  04/18/24-Patient with nasal congestion. He feels well overall.  08/21/24-Patient has been having LLE swelling. He denies any trauma to the area. He has tried compression stockings.  09/28/24-Patient still with LLE, some improvement with conservative measures and diuretics.

## 2024-09-18 NOTE — PHYSICAL EXAM
[Well Developed] : well developed [Well Nourished] : well nourished [No Acute Distress] : no acute distress [Normal Conjunctiva] : normal conjunctiva [Normal Venous Pressure] : normal venous pressure [5th Left ICS - MCL] : palpated at the 5th LICS in the midclavicular line [Normal] : normal [No Precordial Heave] : no precordial heave was noted [Normal Rate] : normal [Rhythm Regular] : regular [Normal S1] : normal S1 [Normal S2] : normal S2 [No Murmur] : no murmurs heard [___+] : [unfilled]U+ pretibial pitting edema on the left [2+] : left 2+ [Clear Lung Fields] : clear lung fields [Good Air Entry] : good air entry [No Respiratory Distress] : no respiratory distress  [Soft] : abdomen soft [Non Tender] : non-tender [Normal Bowel Sounds] : normal bowel sounds [Normal Gait] : normal gait [No Varicosities] : no varicosities [Moves all extremities] : moves all extremities [No Focal Deficits] : no focal deficits [Alert and Oriented] : alert and oriented [de-identified] : No evidence of inguinal hernia or other mass.

## 2024-09-18 NOTE — HISTORY OF PRESENT ILLNESS
[FreeTextEntry1] : Mr. Mcmanus is an 89yo M with PMHx of CAD s/p CABG (LIMA-LAD, SVG-D1, PLV and PDA), HTN, HLD, lymphoma and prostate CA who presents for follow up. His PMD is Dr. Selwyn Hebert and previously followed with Dr. Ashkan Moran. Patient complains of some intermittent B/L chest pain. It will only last a few minutes and not always associated with exertion. Denies SOB, palpitations, lightheadedness/dizziness.   Patient is feeling well. He had COVID in December and took Paxlovid. He has some fatigue, but not worse from baseline. He will have intermittent swelling of his right hand when he wakes up, but it will subside after about 30 minutes.   04/19/23-Patient feeling well. He is looking forward to his grandchildren's weddings over the next few months. Denies CP, SOB, palpitations.  08/23/23-He is feeling well. He had some right groin pain after getting out of his car. No radiation or hx of hernia.  12/14/23-He is doing well. Recently had 89th birthday party. He has been having some nasal congestion and PND.  04/18/24-Patient with nasal congestion. He feels well overall.  08/21/24-Patient has been having LLE swelling. He denies any trauma to the area. He has tried compression stockings.  09/28/24-Patient still with LLE, some improvement with conservative measures and diuretics.

## 2024-09-18 NOTE — REVIEW OF SYSTEMS
[Lower Ext Edema] : lower extremity edema [Negative] : Heme/Lymph [SOB] : no shortness of breath [Dyspnea on exertion] : not dyspnea during exertion [Chest Discomfort] : no chest discomfort

## 2024-09-18 NOTE — PHYSICAL EXAM
[Well Developed] : well developed [Well Nourished] : well nourished [No Acute Distress] : no acute distress [Normal Conjunctiva] : normal conjunctiva [Normal Venous Pressure] : normal venous pressure [5th Left ICS - MCL] : palpated at the 5th LICS in the midclavicular line [Normal] : normal [No Precordial Heave] : no precordial heave was noted [Normal Rate] : normal [Rhythm Regular] : regular [Normal S1] : normal S1 [Normal S2] : normal S2 [No Murmur] : no murmurs heard [___+] : [unfilled]U+ pretibial pitting edema on the left [2+] : left 2+ [Clear Lung Fields] : clear lung fields [Good Air Entry] : good air entry [No Respiratory Distress] : no respiratory distress  [Soft] : abdomen soft [Non Tender] : non-tender [Normal Bowel Sounds] : normal bowel sounds [Normal Gait] : normal gait [No Varicosities] : no varicosities [Moves all extremities] : moves all extremities [No Focal Deficits] : no focal deficits [Alert and Oriented] : alert and oriented [de-identified] : No evidence of inguinal hernia or other mass.

## 2024-09-18 NOTE — REASON FOR VISIT
[Other: ____] : [unfilled] [FreeTextEntry1] : Diagnostic Tests: --------------------- EK24-NSR with 1st degree AVB. PAC. Inferior infarct.  24-NSR with 1st degree AVB. PAC/PVCs. Inferior infarct.  23-NSR with 1st degree AVB. PACs. Old inferior infarct.  23-NSR with 1st degree AVB. LVH. Old inferior infarct.  23-Sinus bradycardia at 53 bpm with 1st degree AVB. Old inferior infarct.  23-Sinus bradycardia at 57 bpm with 1st degree AVB. Old inferior infarct.  22-NSR with 1st degree AVB. PVC. Old inferior infarct.  22-NSR with 1st degree AVB. Old inferior infarct.  22-NSR with 1st degree AVB. Old inferior infarct.  21-NSR with 1st degree AVB. Old inferior infarct.  --------------------- Echo: 2022: EF 60%, Grade III diastolic dysfunction with severely elevated LA pressures, restrictive diastolic function. Mild calcification of AV and MV. Mild to moderate MR. Severe [place]. Mild TR. Normal PA pressures. 21-EF normal. Grade I DD. Moderate MAC. PASP 35 mmHg. Dilated ascending aorta, 3.7 cm.  ------------------- US:  24-LE Venous: No DVT B/L. Right Vale's cyst.

## 2024-09-18 NOTE — REASON FOR VISIT
[Other: ____] : [unfilled] [FreeTextEntry1] : Diagnostic Tests: --------------------- EK24-NSR with 1st degree AVB. PAC. Inferior infarct.  24-NSR with 1st degree AVB. PAC/PVCs. Inferior infarct.  23-NSR with 1st degree AVB. PACs. Old inferior infarct.  23-NSR with 1st degree AVB. LVH. Old inferior infarct.  23-Sinus bradycardia at 53 bpm with 1st degree AVB. Old inferior infarct.  23-Sinus bradycardia at 57 bpm with 1st degree AVB. Old inferior infarct.  22-NSR with 1st degree AVB. PVC. Old inferior infarct.  22-NSR with 1st degree AVB. Old inferior infarct.  22-NSR with 1st degree AVB. Old inferior infarct.  21-NSR with 1st degree AVB. Old inferior infarct.  --------------------- Echo: 2022: EF 60%, Grade III diastolic dysfunction with severely elevated LA pressures, restrictive diastolic function. Mild calcification of AV and MV. Mild to moderate MR. Severe IA. Mild TR. Normal PA pressures. 21-EF normal. Grade I DD. Moderate MAC. PASP 35 mmHg. Dilated ascending aorta, 3.7 cm.  ------------------- US:  24-LE Venous: No DVT B/L. Right Vale's cyst.  room air

## 2024-09-18 NOTE — ASSESSMENT
[FreeTextEntry1] : Chest pain/CAD: May be anginal in nature. Now resolved.  - Continue Toprol 50mg. - If fatigue continues or worsens, will consider decreasing Toprol and trialling long acting nitrates to help with anginal pain. - will hold on stress test as patient's symptoms of chest pain improved. - Continue with ASA, statin and benazepril.  HTN: BP at goal per ACC/AHA 2018 guidelines - Continue with current meds. - Diastolic dysfunction is evident on recent TTE. Blood pressure control is emphasized. - BP slightly elevated at office. Per patient, better controlled at home. - Will continue to monitor.  HLD: , TG 66, HDL 37, LDL 78->, TG 59, HDL 46, LDL 88 (08/23)->, TG 72, HDL 40,  (07/24) -Continue with rosuvastatin 20mg PO daily. - Blood work (lipid profile etc) was ordered - risk factors modification  DM: HA1c 6.5% (07/24) -Lifestyle changes.   Right hand swelling: -B/L BP without difference. -No swelling on exam today. -Will monitor for worsening of symptoms. -Defer US imaging.  Nasal congestion:  -Flonase, nasal saline.   LLE swelling:  -Lasix 20mg PO PRN for swelling. -Continue with diuretics and compression stockings.   Follow up in 3 months.       
[FreeTextEntry1] : Chest pain/CAD: May be anginal in nature. Now resolved.  - Continue Toprol 50mg. - If fatigue continues or worsens, will consider decreasing Toprol and trialling long acting nitrates to help with anginal pain. - will hold on stress test as patient's symptoms of chest pain improved. - Continue with ASA, statin and benazepril.  HTN: BP at goal per ACC/AHA 2018 guidelines - Continue with current meds. - Diastolic dysfunction is evident on recent TTE. Blood pressure control is emphasized. - BP slightly elevated at office. Per patient, better controlled at home. - Will continue to monitor.  HLD: , TG 66, HDL 37, LDL 78->, TG 59, HDL 46, LDL 88 (08/23)->, TG 72, HDL 40,  (07/24) -Continue with rosuvastatin 20mg PO daily. - Blood work (lipid profile etc) was ordered - risk factors modification  DM: HA1c 6.5% (07/24) -Lifestyle changes.   Right hand swelling: -B/L BP without difference. -No swelling on exam today. -Will monitor for worsening of symptoms. -Defer US imaging.  Nasal congestion:  -Flonase, nasal saline.   LLE swelling:  -Lasix 20mg PO PRN for swelling. -Continue with diuretics and compression stockings.   Follow up in 3 months.       
verbal instruction/written material

## 2024-11-07 ENCOUNTER — APPOINTMENT (OUTPATIENT)
Dept: CARDIOLOGY | Facility: CLINIC | Age: 89
End: 2024-11-07
Payer: MEDICARE

## 2024-11-07 VITALS
BODY MASS INDEX: 26.8 KG/M2 | DIASTOLIC BLOOD PRESSURE: 68 MMHG | HEIGHT: 64 IN | HEART RATE: 75 BPM | SYSTOLIC BLOOD PRESSURE: 142 MMHG | WEIGHT: 157 LBS

## 2024-11-07 VITALS — SYSTOLIC BLOOD PRESSURE: 128 MMHG | DIASTOLIC BLOOD PRESSURE: 70 MMHG

## 2024-11-07 DIAGNOSIS — C61 MALIGNANT NEOPLASM OF PROSTATE: ICD-10-CM

## 2024-11-07 DIAGNOSIS — C85.90 NON-HODGKIN LYMPHOMA, UNSPECIFIED, UNSPECIFIED SITE: ICD-10-CM

## 2024-11-07 DIAGNOSIS — I48.91 UNSPECIFIED ATRIAL FIBRILLATION: ICD-10-CM

## 2024-11-07 DIAGNOSIS — R53.83 OTHER FATIGUE: ICD-10-CM

## 2024-11-07 DIAGNOSIS — E78.5 HYPERLIPIDEMIA, UNSPECIFIED: ICD-10-CM

## 2024-11-07 DIAGNOSIS — I10 ESSENTIAL (PRIMARY) HYPERTENSION: ICD-10-CM

## 2024-11-07 DIAGNOSIS — R60.0 LOCALIZED EDEMA: ICD-10-CM

## 2024-11-07 DIAGNOSIS — I25.10 ATHEROSCLEROTIC HEART DISEASE OF NATIVE CORONARY ARTERY W/OUT ANGINA PECTORIS: ICD-10-CM

## 2024-11-07 PROCEDURE — 99214 OFFICE O/P EST MOD 30 MIN: CPT

## 2024-11-07 PROCEDURE — G2211 COMPLEX E/M VISIT ADD ON: CPT

## 2024-11-07 PROCEDURE — 93000 ELECTROCARDIOGRAM COMPLETE: CPT

## 2024-11-20 ENCOUNTER — APPOINTMENT (OUTPATIENT)
Dept: ORTHOPEDIC SURGERY | Facility: CLINIC | Age: 89
End: 2024-11-20
Payer: MEDICARE

## 2024-11-20 DIAGNOSIS — S93.401A SPRAIN OF UNSPECIFIED LIGAMENT OF RIGHT ANKLE, INITIAL ENCOUNTER: ICD-10-CM

## 2024-11-20 PROCEDURE — 99213 OFFICE O/P EST LOW 20 MIN: CPT

## 2024-11-20 PROCEDURE — 73610 X-RAY EXAM OF ANKLE: CPT | Mod: RT

## 2024-11-20 PROCEDURE — 99203 OFFICE O/P NEW LOW 30 MIN: CPT

## 2024-12-04 RX ORDER — RIVAROXABAN 20 MG/1
20 TABLET, FILM COATED ORAL
Qty: 90 | Refills: 3 | Status: ACTIVE | COMMUNITY
Start: 2024-12-04 | End: 1900-01-01

## 2024-12-04 RX ORDER — APIXABAN 5 MG/1
5 TABLET, FILM COATED ORAL
Qty: 180 | Refills: 3 | Status: DISCONTINUED | COMMUNITY
Start: 2024-12-03 | End: 2024-12-04

## 2024-12-11 ENCOUNTER — APPOINTMENT (OUTPATIENT)
Dept: CARDIOLOGY | Facility: CLINIC | Age: 88
End: 2024-12-11
Payer: MEDICARE

## 2024-12-11 PROCEDURE — 93248 EXT ECG>7D<15D REV&INTERPJ: CPT

## 2025-01-17 ENCOUNTER — APPOINTMENT (OUTPATIENT)
Dept: CARDIOLOGY | Facility: CLINIC | Age: 89
End: 2025-01-17
Payer: MEDICARE

## 2025-01-17 VITALS
HEIGHT: 64 IN | HEART RATE: 92 BPM | SYSTOLIC BLOOD PRESSURE: 134 MMHG | OXYGEN SATURATION: 99 % | DIASTOLIC BLOOD PRESSURE: 72 MMHG

## 2025-01-17 DIAGNOSIS — I48.91 UNSPECIFIED ATRIAL FIBRILLATION: ICD-10-CM

## 2025-01-17 DIAGNOSIS — E78.5 HYPERLIPIDEMIA, UNSPECIFIED: ICD-10-CM

## 2025-01-17 DIAGNOSIS — I10 ESSENTIAL (PRIMARY) HYPERTENSION: ICD-10-CM

## 2025-01-17 DIAGNOSIS — I25.10 ATHEROSCLEROTIC HEART DISEASE OF NATIVE CORONARY ARTERY W/OUT ANGINA PECTORIS: ICD-10-CM

## 2025-01-17 DIAGNOSIS — C61 MALIGNANT NEOPLASM OF PROSTATE: ICD-10-CM

## 2025-01-17 DIAGNOSIS — R06.02 SHORTNESS OF BREATH: ICD-10-CM

## 2025-01-17 DIAGNOSIS — C85.90 NON-HODGKIN LYMPHOMA, UNSPECIFIED, UNSPECIFIED SITE: ICD-10-CM

## 2025-01-17 PROCEDURE — 99214 OFFICE O/P EST MOD 30 MIN: CPT

## 2025-01-17 PROCEDURE — G2211 COMPLEX E/M VISIT ADD ON: CPT

## 2025-01-17 PROCEDURE — 93000 ELECTROCARDIOGRAM COMPLETE: CPT

## 2025-01-23 ENCOUNTER — INPATIENT (INPATIENT)
Facility: HOSPITAL | Age: 89
LOS: 0 days | Discharge: HOME | DRG: 913 | End: 2025-01-24
Attending: SURGERY | Admitting: SURGERY
Payer: MEDICARE

## 2025-01-23 ENCOUNTER — RESULT REVIEW (OUTPATIENT)
Age: 89
End: 2025-01-23

## 2025-01-23 VITALS
SYSTOLIC BLOOD PRESSURE: 88 MMHG | RESPIRATION RATE: 18 BRPM | OXYGEN SATURATION: 94 % | DIASTOLIC BLOOD PRESSURE: 46 MMHG | HEART RATE: 99 BPM

## 2025-01-23 DIAGNOSIS — Z95.1 PRESENCE OF AORTOCORONARY BYPASS GRAFT: Chronic | ICD-10-CM

## 2025-01-23 DIAGNOSIS — Z98.890 OTHER SPECIFIED POSTPROCEDURAL STATES: Chronic | ICD-10-CM
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
## 2025-01-23 PROBLEM — Z78.9 OTHER SPECIFIED HEALTH STATUS: Chronic | Status: INACTIVE | Noted: 2023-02-08 | Resolved: 2025-01-23

## 2025-01-23 LAB
ABO RH CONFIRMATION: SIGNIFICANT CHANGE UP
ALBUMIN SERPL ELPH-MCNC: 2.8 G/DL — LOW (ref 3.5–5.2)
ALBUMIN SERPL ELPH-MCNC: 3.3 G/DL — LOW (ref 3.5–5.2)
ALP SERPL-CCNC: 69 U/L — SIGNIFICANT CHANGE UP (ref 30–115)
ALP SERPL-CCNC: 85 U/L — SIGNIFICANT CHANGE UP (ref 30–115)
ALT FLD-CCNC: 10 U/L — SIGNIFICANT CHANGE UP (ref 0–41)
ALT FLD-CCNC: 9 U/L — SIGNIFICANT CHANGE UP (ref 0–41)
ANION GAP SERPL CALC-SCNC: 11 MMOL/L — SIGNIFICANT CHANGE UP (ref 7–14)
ANION GAP SERPL CALC-SCNC: 13 MMOL/L — SIGNIFICANT CHANGE UP (ref 7–14)
ANION GAP SERPL CALC-SCNC: 18 MMOL/L — HIGH (ref 7–14)
APPEARANCE UR: CLEAR — SIGNIFICANT CHANGE UP
APTT BLD: 36.2 SEC — SIGNIFICANT CHANGE UP (ref 27–39.2)
APTT BLD: 37.6 SEC — SIGNIFICANT CHANGE UP (ref 27–39.2)
APTT BLD: 44.7 SEC — HIGH (ref 27–39.2)
AST SERPL-CCNC: 22 U/L — SIGNIFICANT CHANGE UP (ref 0–41)
AST SERPL-CCNC: 23 U/L — SIGNIFICANT CHANGE UP (ref 0–41)
BASOPHILS # BLD AUTO: 0.05 K/UL — SIGNIFICANT CHANGE UP (ref 0–0.2)
BASOPHILS # BLD AUTO: 0.05 K/UL — SIGNIFICANT CHANGE UP (ref 0–0.2)
BASOPHILS # BLD AUTO: 0.09 K/UL — SIGNIFICANT CHANGE UP (ref 0–0.2)
BASOPHILS NFR BLD AUTO: 0.4 % — SIGNIFICANT CHANGE UP (ref 0–1)
BASOPHILS NFR BLD AUTO: 0.5 % — SIGNIFICANT CHANGE UP (ref 0–1)
BASOPHILS NFR BLD AUTO: 0.6 % — SIGNIFICANT CHANGE UP (ref 0–1)
BILIRUB DIRECT SERPL-MCNC: 0.2 MG/DL — SIGNIFICANT CHANGE UP (ref 0–0.3)
BILIRUB INDIRECT FLD-MCNC: 0.3 MG/DL — SIGNIFICANT CHANGE UP (ref 0.2–1.2)
BILIRUB SERPL-MCNC: 0.3 MG/DL — SIGNIFICANT CHANGE UP (ref 0.2–1.2)
BILIRUB SERPL-MCNC: 0.5 MG/DL — SIGNIFICANT CHANGE UP (ref 0.2–1.2)
BILIRUB UR-MCNC: NEGATIVE — SIGNIFICANT CHANGE UP
BUN SERPL-MCNC: 33 MG/DL — HIGH (ref 10–20)
CALCIUM SERPL-MCNC: 8.2 MG/DL — LOW (ref 8.4–10.4)
CALCIUM SERPL-MCNC: 8.5 MG/DL — SIGNIFICANT CHANGE UP (ref 8.4–10.5)
CALCIUM SERPL-MCNC: 8.6 MG/DL — SIGNIFICANT CHANGE UP (ref 8.4–10.5)
CHLORIDE SERPL-SCNC: 102 MMOL/L — SIGNIFICANT CHANGE UP (ref 98–110)
CHLORIDE SERPL-SCNC: 105 MMOL/L — SIGNIFICANT CHANGE UP (ref 98–110)
CHLORIDE SERPL-SCNC: 106 MMOL/L — SIGNIFICANT CHANGE UP (ref 98–110)
CO2 SERPL-SCNC: 20 MMOL/L — SIGNIFICANT CHANGE UP (ref 17–32)
CO2 SERPL-SCNC: 23 MMOL/L — SIGNIFICANT CHANGE UP (ref 17–32)
CO2 SERPL-SCNC: 23 MMOL/L — SIGNIFICANT CHANGE UP (ref 17–32)
COLOR SPEC: YELLOW — SIGNIFICANT CHANGE UP
CREAT ?TM UR-MCNC: 154 MG/DL — SIGNIFICANT CHANGE UP
CREAT SERPL-MCNC: 1.8 MG/DL — HIGH (ref 0.7–1.5)
CREAT SERPL-MCNC: 1.8 MG/DL — HIGH (ref 0.7–1.5)
CREAT SERPL-MCNC: 1.9 MG/DL — HIGH (ref 0.7–1.5)
DIFF PNL FLD: NEGATIVE — SIGNIFICANT CHANGE UP
EGFR: 33 ML/MIN/1.73M2 — LOW
EGFR: 35 ML/MIN/1.73M2 — LOW
EGFR: 35 ML/MIN/1.73M2 — LOW
EOSINOPHIL # BLD AUTO: 0.18 K/UL — SIGNIFICANT CHANGE UP (ref 0–0.7)
EOSINOPHIL # BLD AUTO: 0.47 K/UL — SIGNIFICANT CHANGE UP (ref 0–0.7)
EOSINOPHIL # BLD AUTO: 0.85 K/UL — HIGH (ref 0–0.7)
EOSINOPHIL NFR BLD AUTO: 1.4 % — SIGNIFICANT CHANGE UP (ref 0–8)
EOSINOPHIL NFR BLD AUTO: 4.8 % — SIGNIFICANT CHANGE UP (ref 0–8)
EOSINOPHIL NFR BLD AUTO: 5.5 % — SIGNIFICANT CHANGE UP (ref 0–8)
ETHANOL SERPL-MCNC: <10 MG/DL — SIGNIFICANT CHANGE UP
GLUCOSE BLDC GLUCOMTR-MCNC: 112 MG/DL — HIGH (ref 70–99)
GLUCOSE BLDC GLUCOMTR-MCNC: 112 MG/DL — HIGH (ref 70–99)
GLUCOSE BLDC GLUCOMTR-MCNC: 154 MG/DL — HIGH (ref 70–99)
GLUCOSE BLDC GLUCOMTR-MCNC: 156 MG/DL — HIGH (ref 70–99)
GLUCOSE SERPL-MCNC: 156 MG/DL — HIGH (ref 70–99)
GLUCOSE SERPL-MCNC: 203 MG/DL — HIGH (ref 70–99)
GLUCOSE SERPL-MCNC: 97 MG/DL — SIGNIFICANT CHANGE UP (ref 70–99)
GLUCOSE UR QL: NEGATIVE MG/DL — SIGNIFICANT CHANGE UP
HCT VFR BLD CALC: 24.9 % — LOW (ref 42–52)
HCT VFR BLD CALC: 24.9 % — LOW (ref 42–52)
HCT VFR BLD CALC: 29.5 % — LOW (ref 42–52)
HGB BLD-MCNC: 7.8 G/DL — LOW (ref 14–18)
HGB BLD-MCNC: 7.9 G/DL — LOW (ref 14–18)
HGB BLD-MCNC: 9.4 G/DL — LOW (ref 14–18)
IMM GRANULOCYTES NFR BLD AUTO: 0.3 % — SIGNIFICANT CHANGE UP (ref 0.1–0.3)
IMM GRANULOCYTES NFR BLD AUTO: 0.4 % — HIGH (ref 0.1–0.3)
IMM GRANULOCYTES NFR BLD AUTO: 0.5 % — HIGH (ref 0.1–0.3)
INR BLD: 1.99 RATIO — HIGH (ref 0.65–1.3)
INR BLD: 2.7 RATIO — HIGH (ref 0.65–1.3)
INR BLD: 3.81 RATIO — HIGH (ref 0.65–1.3)
KETONES UR-MCNC: ABNORMAL MG/DL
LACTATE SERPL-SCNC: 1.8 MMOL/L — SIGNIFICANT CHANGE UP (ref 0.7–2)
LACTATE SERPL-SCNC: 2.2 MMOL/L — HIGH (ref 0.7–2)
LEUKOCYTE ESTERASE UR-ACNC: NEGATIVE — SIGNIFICANT CHANGE UP
LIDOCAIN IGE QN: 54 U/L — SIGNIFICANT CHANGE UP (ref 7–60)
LYMPHOCYTES # BLD AUTO: 28.1 % — SIGNIFICANT CHANGE UP (ref 20.5–51.1)
LYMPHOCYTES # BLD AUTO: 28.4 % — SIGNIFICANT CHANGE UP (ref 20.5–51.1)
LYMPHOCYTES # BLD AUTO: 3.09 K/UL — SIGNIFICANT CHANGE UP (ref 1.2–3.4)
LYMPHOCYTES # BLD AUTO: 3.61 K/UL — HIGH (ref 1.2–3.4)
LYMPHOCYTES # BLD AUTO: 31.5 % — SIGNIFICANT CHANGE UP (ref 20.5–51.1)
LYMPHOCYTES # BLD AUTO: 4.37 K/UL — HIGH (ref 1.2–3.4)
MAGNESIUM SERPL-MCNC: 1.9 MG/DL — SIGNIFICANT CHANGE UP (ref 1.8–2.4)
MAGNESIUM SERPL-MCNC: 2 MG/DL — SIGNIFICANT CHANGE UP (ref 1.8–2.4)
MAGNESIUM SERPL-MCNC: 2.1 MG/DL — SIGNIFICANT CHANGE UP (ref 1.8–2.4)
MCHC RBC-ENTMCNC: 28.3 PG — SIGNIFICANT CHANGE UP (ref 27–31)
MCHC RBC-ENTMCNC: 28.5 PG — SIGNIFICANT CHANGE UP (ref 27–31)
MCHC RBC-ENTMCNC: 28.7 PG — SIGNIFICANT CHANGE UP (ref 27–31)
MCHC RBC-ENTMCNC: 31.3 G/DL — LOW (ref 32–37)
MCHC RBC-ENTMCNC: 31.7 G/DL — LOW (ref 32–37)
MCHC RBC-ENTMCNC: 31.9 G/DL — LOW (ref 32–37)
MCV RBC AUTO: 89.2 FL — SIGNIFICANT CHANGE UP (ref 80–94)
MCV RBC AUTO: 90.2 FL — SIGNIFICANT CHANGE UP (ref 80–94)
MCV RBC AUTO: 90.9 FL — SIGNIFICANT CHANGE UP (ref 80–94)
MONOCYTES # BLD AUTO: 0.58 K/UL — SIGNIFICANT CHANGE UP (ref 0.1–0.6)
MONOCYTES # BLD AUTO: 0.69 K/UL — HIGH (ref 0.1–0.6)
MONOCYTES # BLD AUTO: 0.8 K/UL — HIGH (ref 0.1–0.6)
MONOCYTES NFR BLD AUTO: 4.6 % — SIGNIFICANT CHANGE UP (ref 1.7–9.3)
MONOCYTES NFR BLD AUTO: 5.2 % — SIGNIFICANT CHANGE UP (ref 1.7–9.3)
MONOCYTES NFR BLD AUTO: 7 % — SIGNIFICANT CHANGE UP (ref 1.7–9.3)
MRSA PCR RESULT.: NEGATIVE — SIGNIFICANT CHANGE UP
NEUTROPHILS # BLD AUTO: 5.47 K/UL — SIGNIFICANT CHANGE UP (ref 1.4–6.5)
NEUTROPHILS # BLD AUTO: 8.24 K/UL — HIGH (ref 1.4–6.5)
NEUTROPHILS # BLD AUTO: 9.36 K/UL — HIGH (ref 1.4–6.5)
NEUTROPHILS NFR BLD AUTO: 55.9 % — SIGNIFICANT CHANGE UP (ref 42.2–75.2)
NEUTROPHILS NFR BLD AUTO: 60.2 % — SIGNIFICANT CHANGE UP (ref 42.2–75.2)
NEUTROPHILS NFR BLD AUTO: 64.7 % — SIGNIFICANT CHANGE UP (ref 42.2–75.2)
NITRITE UR-MCNC: NEGATIVE — SIGNIFICANT CHANGE UP
NRBC # BLD: 0 /100 WBCS — SIGNIFICANT CHANGE UP (ref 0–0)
NRBC BLD-RTO: 0 /100 WBCS — SIGNIFICANT CHANGE UP (ref 0–0)
PH UR: 5.5 — SIGNIFICANT CHANGE UP (ref 5–8)
PHOSPHATE SERPL-MCNC: 3.6 MG/DL — SIGNIFICANT CHANGE UP (ref 2.1–4.9)
PHOSPHATE SERPL-MCNC: 4.2 MG/DL — SIGNIFICANT CHANGE UP (ref 2.1–4.9)
PLATELET # BLD AUTO: 254 K/UL — SIGNIFICANT CHANGE UP (ref 130–400)
PLATELET # BLD AUTO: 280 K/UL — SIGNIFICANT CHANGE UP (ref 130–400)
PLATELET # BLD AUTO: 352 K/UL — SIGNIFICANT CHANGE UP (ref 130–400)
PMV BLD: 9.2 FL — SIGNIFICANT CHANGE UP (ref 7.4–10.4)
PMV BLD: 9.8 FL — SIGNIFICANT CHANGE UP (ref 7.4–10.4)
PMV BLD: 9.8 FL — SIGNIFICANT CHANGE UP (ref 7.4–10.4)
POTASSIUM SERPL-MCNC: 3.9 MMOL/L — SIGNIFICANT CHANGE UP (ref 3.5–5)
POTASSIUM SERPL-MCNC: 4.1 MMOL/L — SIGNIFICANT CHANGE UP (ref 3.5–5)
POTASSIUM SERPL-MCNC: 4.3 MMOL/L — SIGNIFICANT CHANGE UP (ref 3.5–5)
POTASSIUM SERPL-SCNC: 3.9 MMOL/L — SIGNIFICANT CHANGE UP (ref 3.5–5)
POTASSIUM SERPL-SCNC: 4.1 MMOL/L — SIGNIFICANT CHANGE UP (ref 3.5–5)
POTASSIUM SERPL-SCNC: 4.3 MMOL/L — SIGNIFICANT CHANGE UP (ref 3.5–5)
PROT SERPL-MCNC: 5 G/DL — LOW (ref 6–8)
PROT SERPL-MCNC: 5.9 G/DL — LOW (ref 6–8)
PROT UR-MCNC: 100 MG/DL
PROTHROM AB SERPL-ACNC: 23.8 SEC — HIGH (ref 9.95–12.87)
PROTHROM AB SERPL-ACNC: 32.5 SEC — HIGH (ref 9.95–12.87)
PROTHROM AB SERPL-ACNC: >40 SEC — HIGH (ref 9.95–12.87)
RBC # BLD: 2.74 M/UL — LOW (ref 4.7–6.1)
RBC # BLD: 2.79 M/UL — LOW (ref 4.7–6.1)
RBC # BLD: 3.27 M/UL — LOW (ref 4.7–6.1)
RBC # FLD: 13.5 % — SIGNIFICANT CHANGE UP (ref 11.5–14.5)
RBC # FLD: 13.5 % — SIGNIFICANT CHANGE UP (ref 11.5–14.5)
RBC # FLD: 14.5 % — SIGNIFICANT CHANGE UP (ref 11.5–14.5)
SODIUM SERPL-SCNC: 139 MMOL/L — SIGNIFICANT CHANGE UP (ref 135–146)
SODIUM SERPL-SCNC: 140 MMOL/L — SIGNIFICANT CHANGE UP (ref 135–146)
SODIUM SERPL-SCNC: 142 MMOL/L — SIGNIFICANT CHANGE UP (ref 135–146)
SODIUM UR-SCNC: 26 MMOL/L — SIGNIFICANT CHANGE UP
SP GR SPEC: >1.03 — HIGH (ref 1–1.03)
UROBILINOGEN FLD QL: 1 MG/DL — SIGNIFICANT CHANGE UP (ref 0.2–1)
WBC # BLD: 12.73 K/UL — HIGH (ref 4.8–10.8)
WBC # BLD: 15.53 K/UL — HIGH (ref 4.8–10.8)
WBC # BLD: 9.8 K/UL — SIGNIFICANT CHANGE UP (ref 4.8–10.8)
WBC # FLD AUTO: 12.73 K/UL — HIGH (ref 4.8–10.8)
WBC # FLD AUTO: 15.53 K/UL — HIGH (ref 4.8–10.8)
WBC # FLD AUTO: 9.8 K/UL — SIGNIFICANT CHANGE UP (ref 4.8–10.8)

## 2025-01-23 PROCEDURE — 99291 CRITICAL CARE FIRST HOUR: CPT

## 2025-01-23 PROCEDURE — 82570 ASSAY OF URINE CREATININE: CPT

## 2025-01-23 PROCEDURE — 87640 STAPH A DNA AMP PROBE: CPT

## 2025-01-23 PROCEDURE — 73090 X-RAY EXAM OF FOREARM: CPT | Mod: LT

## 2025-01-23 PROCEDURE — 73070 X-RAY EXAM OF ELBOW: CPT | Mod: LT

## 2025-01-23 PROCEDURE — 93306 TTE W/DOPPLER COMPLETE: CPT

## 2025-01-23 PROCEDURE — 85027 COMPLETE CBC AUTOMATED: CPT

## 2025-01-23 PROCEDURE — 73070 X-RAY EXAM OF ELBOW: CPT | Mod: 26,LT

## 2025-01-23 PROCEDURE — 36430 TRANSFUSION BLD/BLD COMPNT: CPT

## 2025-01-23 PROCEDURE — 87641 MR-STAPH DNA AMP PROBE: CPT

## 2025-01-23 PROCEDURE — 82962 GLUCOSE BLOOD TEST: CPT

## 2025-01-23 PROCEDURE — 73502 X-RAY EXAM HIP UNI 2-3 VIEWS: CPT | Mod: 26,LT

## 2025-01-23 PROCEDURE — 83605 ASSAY OF LACTIC ACID: CPT

## 2025-01-23 PROCEDURE — 99223 1ST HOSP IP/OBS HIGH 75: CPT

## 2025-01-23 PROCEDURE — 71045 X-RAY EXAM CHEST 1 VIEW: CPT | Mod: 26

## 2025-01-23 PROCEDURE — 74174 CTA ABD&PLVS W/CONTRAST: CPT | Mod: MC

## 2025-01-23 PROCEDURE — 73130 X-RAY EXAM OF HAND: CPT | Mod: 26,LT

## 2025-01-23 PROCEDURE — 36415 COLL VENOUS BLD VENIPUNCTURE: CPT

## 2025-01-23 PROCEDURE — 99285 EMERGENCY DEPT VISIT HI MDM: CPT | Mod: FS

## 2025-01-23 PROCEDURE — 85025 COMPLETE CBC W/AUTO DIFF WBC: CPT

## 2025-01-23 PROCEDURE — 99222 1ST HOSP IP/OBS MODERATE 55: CPT

## 2025-01-23 PROCEDURE — 72170 X-RAY EXAM OF PELVIS: CPT | Mod: 26,XE

## 2025-01-23 PROCEDURE — 73130 X-RAY EXAM OF HAND: CPT | Mod: LT

## 2025-01-23 PROCEDURE — 84100 ASSAY OF PHOSPHORUS: CPT

## 2025-01-23 PROCEDURE — P9016: CPT

## 2025-01-23 PROCEDURE — 84300 ASSAY OF URINE SODIUM: CPT

## 2025-01-23 PROCEDURE — 80048 BASIC METABOLIC PNL TOTAL CA: CPT

## 2025-01-23 PROCEDURE — 93306 TTE W/DOPPLER COMPLETE: CPT | Mod: 26

## 2025-01-23 PROCEDURE — 86900 BLOOD TYPING SEROLOGIC ABO: CPT

## 2025-01-23 PROCEDURE — 70450 CT HEAD/BRAIN W/O DYE: CPT | Mod: 26

## 2025-01-23 PROCEDURE — 74177 CT ABD & PELVIS W/CONTRAST: CPT | Mod: 26

## 2025-01-23 PROCEDURE — 85610 PROTHROMBIN TIME: CPT

## 2025-01-23 PROCEDURE — 86923 COMPATIBILITY TEST ELECTRIC: CPT

## 2025-01-23 PROCEDURE — 97162 PT EVAL MOD COMPLEX 30 MIN: CPT | Mod: GP

## 2025-01-23 PROCEDURE — 86850 RBC ANTIBODY SCREEN: CPT

## 2025-01-23 PROCEDURE — 73090 X-RAY EXAM OF FOREARM: CPT | Mod: 26,LT

## 2025-01-23 PROCEDURE — 80076 HEPATIC FUNCTION PANEL: CPT

## 2025-01-23 PROCEDURE — 81001 URINALYSIS AUTO W/SCOPE: CPT

## 2025-01-23 PROCEDURE — 85730 THROMBOPLASTIN TIME PARTIAL: CPT

## 2025-01-23 PROCEDURE — 71260 CT THORAX DX C+: CPT | Mod: 26

## 2025-01-23 PROCEDURE — 83735 ASSAY OF MAGNESIUM: CPT

## 2025-01-23 PROCEDURE — 86901 BLOOD TYPING SEROLOGIC RH(D): CPT

## 2025-01-23 PROCEDURE — 72125 CT NECK SPINE W/O DYE: CPT | Mod: 26

## 2025-01-23 RX ORDER — MAGNESIUM SULFATE 0.8 MEQ/ML
1 AMPUL (ML) INJECTION ONCE
Refills: 0 | Status: COMPLETED | OUTPATIENT
Start: 2025-01-23 | End: 2025-01-23

## 2025-01-23 RX ORDER — METHOCARBAMOL 500 MG
500 TABLET ORAL EVERY 8 HOURS
Refills: 0 | Status: DISCONTINUED | OUTPATIENT
Start: 2025-01-23 | End: 2025-01-24

## 2025-01-23 RX ORDER — POLYETHYLENE GLYCOL 3350 17 G/17G
17 POWDER, FOR SOLUTION ORAL DAILY
Refills: 0 | Status: DISCONTINUED | OUTPATIENT
Start: 2025-01-23 | End: 2025-01-24

## 2025-01-23 RX ORDER — ANTISEPTIC SURGICAL SCRUB 0.04 MG/ML
1 SOLUTION TOPICAL
Refills: 0 | Status: DISCONTINUED | OUTPATIENT
Start: 2025-01-23 | End: 2025-01-24

## 2025-01-23 RX ORDER — LIDOCAINE HYDROCHLORIDE 30 MG/G
1 CREAM TOPICAL DAILY
Refills: 0 | Status: DISCONTINUED | OUTPATIENT
Start: 2025-01-23 | End: 2025-01-24

## 2025-01-23 RX ORDER — ALLOPURINOL 300 MG
1 TABLET ORAL
Refills: 0 | DISCHARGE

## 2025-01-23 RX ORDER — METOPROLOL SUCCINATE 25 MG
25 TABLET, EXTENDED RELEASE 24 HR ORAL
Refills: 0 | Status: DISCONTINUED | OUTPATIENT
Start: 2025-01-23 | End: 2025-01-23

## 2025-01-23 RX ORDER — ROSUVASTATIN CALCIUM 10 MG/1
20 TABLET, FILM COATED ORAL AT BEDTIME
Refills: 0 | Status: DISCONTINUED | OUTPATIENT
Start: 2025-01-23 | End: 2025-01-24

## 2025-01-23 RX ORDER — PROTHROMBIN COMPLEX CONCENTRATE (HUMAN) 25.5; 16.5; 24; 22; 22; 26 [IU]/ML; [IU]/ML; [IU]/ML; [IU]/ML; [IU]/ML; [IU]/ML
1000 POWDER, FOR SOLUTION INTRAVENOUS ONCE
Refills: 0 | Status: COMPLETED | OUTPATIENT
Start: 2025-01-23 | End: 2025-01-23

## 2025-01-23 RX ORDER — ACETAMINOPHEN 160 MG/5ML
650 SUSPENSION ORAL EVERY 6 HOURS
Refills: 0 | Status: DISCONTINUED | OUTPATIENT
Start: 2025-01-23 | End: 2025-01-24

## 2025-01-23 RX ORDER — SENNOSIDES 8.6 MG
2 TABLET ORAL AT BEDTIME
Refills: 0 | Status: DISCONTINUED | OUTPATIENT
Start: 2025-01-23 | End: 2025-01-24

## 2025-01-23 RX ORDER — SODIUM CHLORIDE 9 G/ML
1000 INJECTION, SOLUTION INTRAVENOUS
Refills: 0 | Status: DISCONTINUED | OUTPATIENT
Start: 2025-01-23 | End: 2025-01-24

## 2025-01-23 RX ORDER — ALLOPURINOL 300 MG
100 TABLET ORAL DAILY
Refills: 0 | Status: DISCONTINUED | OUTPATIENT
Start: 2025-01-23 | End: 2025-01-24

## 2025-01-23 RX ORDER — METOPROLOL SUCCINATE 25 MG
1 TABLET, EXTENDED RELEASE 24 HR ORAL
Refills: 0 | DISCHARGE

## 2025-01-23 RX ORDER — METOPROLOL SUCCINATE 25 MG
12.5 TABLET, EXTENDED RELEASE 24 HR ORAL EVERY 12 HOURS
Refills: 0 | Status: DISCONTINUED | OUTPATIENT
Start: 2025-01-23 | End: 2025-01-24

## 2025-01-23 RX ORDER — SODIUM CHLORIDE 9 G/ML
500 INJECTION, SOLUTION INTRAVENOUS ONCE
Refills: 0 | Status: COMPLETED | OUTPATIENT
Start: 2025-01-23 | End: 2025-01-23

## 2025-01-23 RX ORDER — SODIUM CHLORIDE 9 G/ML
1000 INJECTION, SOLUTION INTRAVENOUS ONCE
Refills: 0 | Status: COMPLETED | OUTPATIENT
Start: 2025-01-23 | End: 2025-01-23

## 2025-01-23 RX ORDER — ROSUVASTATIN CALCIUM 10 MG/1
1 TABLET, FILM COATED ORAL
Refills: 0 | DISCHARGE

## 2025-01-23 RX ORDER — PROTHROMBIN COMPLEX CONCENTRATE (HUMAN) 25.5; 16.5; 24; 22; 22; 26 [IU]/ML; [IU]/ML; [IU]/ML; [IU]/ML; [IU]/ML; [IU]/ML
1500 POWDER, FOR SOLUTION INTRAVENOUS ONCE
Refills: 0 | Status: COMPLETED | OUTPATIENT
Start: 2025-01-23 | End: 2025-01-23

## 2025-01-23 RX ORDER — POTASSIUM CHLORIDE 750 MG/1
20 TABLET, EXTENDED RELEASE ORAL ONCE
Refills: 0 | Status: DISCONTINUED | OUTPATIENT
Start: 2025-01-23 | End: 2025-01-23

## 2025-01-23 RX ADMIN — POLYETHYLENE GLYCOL 3350 17 GRAM(S): 17 POWDER, FOR SOLUTION ORAL at 11:50

## 2025-01-23 RX ADMIN — ACETAMINOPHEN 650 MILLIGRAM(S): 160 SUSPENSION ORAL at 11:49

## 2025-01-23 RX ADMIN — LIDOCAINE HYDROCHLORIDE 1 PATCH: 30 CREAM TOPICAL at 11:51

## 2025-01-23 RX ADMIN — Medication 500 MILLIGRAM(S): at 13:03

## 2025-01-23 RX ADMIN — ACETAMINOPHEN 650 MILLIGRAM(S): 160 SUSPENSION ORAL at 23:01

## 2025-01-23 RX ADMIN — SODIUM CHLORIDE 1000 MILLILITER(S): 9 INJECTION, SOLUTION INTRAVENOUS at 08:08

## 2025-01-23 RX ADMIN — ANTISEPTIC SURGICAL SCRUB 1 APPLICATION(S): 0.04 SOLUTION TOPICAL at 17:41

## 2025-01-23 RX ADMIN — PROTHROMBIN COMPLEX CONCENTRATE (HUMAN) 400 INTERNATIONAL UNIT(S): 25.5; 16.5; 24; 22; 22; 26 POWDER, FOR SOLUTION INTRAVENOUS at 08:34

## 2025-01-23 RX ADMIN — Medication 500 MILLIGRAM(S): at 21:02

## 2025-01-23 RX ADMIN — ROSUVASTATIN CALCIUM 20 MILLIGRAM(S): 10 TABLET, FILM COATED ORAL at 21:02

## 2025-01-23 RX ADMIN — LIDOCAINE HYDROCHLORIDE 1 PATCH: 30 CREAM TOPICAL at 19:00

## 2025-01-23 RX ADMIN — ACETAMINOPHEN 650 MILLIGRAM(S): 160 SUSPENSION ORAL at 00:00

## 2025-01-23 RX ADMIN — SODIUM CHLORIDE 500 MILLILITER(S): 9 INJECTION, SOLUTION INTRAVENOUS at 12:41

## 2025-01-23 RX ADMIN — Medication 2 TABLET(S): at 21:01

## 2025-01-23 RX ADMIN — ACETAMINOPHEN 650 MILLIGRAM(S): 160 SUSPENSION ORAL at 17:37

## 2025-01-23 RX ADMIN — Medication 100 MILLIGRAM(S): at 11:49

## 2025-01-23 RX ADMIN — Medication 100 GRAM(S): at 12:16

## 2025-01-23 RX ADMIN — ACETAMINOPHEN 650 MILLIGRAM(S): 160 SUSPENSION ORAL at 13:00

## 2025-01-23 RX ADMIN — Medication 12.5 MILLIGRAM(S): at 11:50

## 2025-01-23 RX ADMIN — Medication 12.5 MILLIGRAM(S): at 17:37

## 2025-01-23 RX ADMIN — PROTHROMBIN COMPLEX CONCENTRATE (HUMAN) 400 INTERNATIONAL UNIT(S): 25.5; 16.5; 24; 22; 22; 26 POWDER, FOR SOLUTION INTRAVENOUS at 17:37

## 2025-01-23 RX ADMIN — SODIUM CHLORIDE 120 MILLILITER(S): 9 INJECTION, SOLUTION INTRAVENOUS at 11:51

## 2025-01-23 NOTE — H&P ADULT - HISTORY OF PRESENT ILLNESS
89M with PMH of HTN, HLD, gout, CAD s/p CABGx4, afib on xarelto presents to the ED s/p mechanical fall (+HT, -LOC, +AC). Patient lives at home with family next door and ambulates with a walker at baseline. Patient got up to use the bathroom around 3am this morning when he rounded a corner and fell on top of his walker, hitting his left abdomen on the walker as he went down. Patient was unable to get up on his own. Family was nearby and was able to assist him up. Patient reported abdominal pain, presented to [facility] and received a panscan revealing retroperitoneal hemorrhage. Patient transferred to [facility] for further management. ABCs intact, GCS 15. External signs of trauma: right temple abrasion, right finger abrasions

## 2025-01-23 NOTE — CONSULT NOTE ADULT - ATTENDING COMMENTS
Small nodular opacity on CT in feb 2023   Now large cavitary lesion in the RLL  Check procal, sputum culture if possible   Empiric abx for 7-10 days   Repeat CT in 4-6 weeks   Diff: Malignancy vs infectious   Speech and swallow eval   Goals of care with family   Rest of care per SICU   On room air   Agree with above  Will follow

## 2025-01-23 NOTE — H&P ADULT - ASSESSMENT
89M with PMH of HTN, HLD, gout, CAD s/p CABGx4, afib on xarelto presents to the ED s/p mechanical fall (+HT, -LOC, +AC). Patient lives at home with family next door and ambulates with a walker at baseline. Patient got up to use the bathroom around 3am this morning when he rounded a corner and fell on top of his walker, hitting his left abdomen on the walker as he went down. Patient was unable to get up on his own. Family was nearby and was able to assist him up. Patient reported abdominal pain, presented to [facility] and received a panscan revealing retroperitoneal hemorrhage. Patient transferred to [facility] for further management. ABCs intact, GCS 15. External signs of trauma: right temple abrasion, right finger abrasions    PLAN:  #L Retroperitoneal Hemorrhage   - Admit to surgical service under Dr. Le   - S/p KCentra   - SICU consult   - IR consult for possible embolization   - Pain control (APAP / flexeril / lido patch)   - NPO w IVF pending possible procedure   - Monitor I/O   - holding DVT ppx and AC in setting of acute hemorrhage   - Hemodynamic monitoring   - Resume home meds as indicated   - PT/Phys Consult when approrpaite    Discussed plan with attending surgeon on call, Dr. Le  [id number]

## 2025-01-23 NOTE — ED PROVIDER NOTE - PROGRESS NOTE DETAILS
Patient with retroperitoneal hematoma, blood pressure responded without any intervention, will transfer [place] for trauma evaluation and further management, Dr. Gina PIMENTEL crit aware, ambulance will be here prior to PCC, [facility] at bedside, hemodynamically stable Marek Boyd DO: arrived to [facility]. hd stable. appears well. trauma alert > evaluated. given bolus fluid and kcentra for reversal. admitted to SICU.

## 2025-01-23 NOTE — ED PROVIDER NOTE - PHYSICAL EXAMINATION
Physical Exam    Vital Signs: I have reviewed the initial vital signs.  Constitutional:  no acute distress  Eyes: Conjunctiva pink, Sclera clear, PERRLA, EOMI.  Cardiovascular: S1 and S2, irregular rate, irregular rhythm, well-perfused extremities, radial pulses equal and 2+  Respiratory: unlabored respiratory effort, clear to auscultation bilaterally no wheezing, rales and rhonchi  Gastrointestinal: soft, non-tender abdomen, no pulsatile mass, normal bowl sounds  Musculoskeletal: supple neck, no lower extremity edema, no midline tenderness  Integumentary: warm, dry, + abrasions to face, head, and hands  Neurologic: awake, alert, cranial nerves II-XII grossly intact, extremities’ motor and sensory functions grossly intact  Psychiatric: appropriate mood, appropriate affect

## 2025-01-23 NOTE — CONSULT NOTE ADULT - ASSESSMENT
89M with PMH of HTN, HLD, gout, CAD s/p CABGx4, afib on xarelto, lymphoma, prostate cancer s/p prostatectomy who presented s/p mechanical fall (+HT, -LOC, +AC) with left retroperitoneal hematoma and left pelvic sidewall/Iliacus intramuscular hematoma with active extravasation    NEUROLOGICAL:  #Acute pain    -APAP 650mg q6h    -Robaxin 500mg q8h    -Lidocaine patch     RESPIRATORY:   #Oxygenation    -saturating well on RA    -Encourage incentive spirometry - pulling 1.75L   #Activity    -increase as tolerated  #Right lower lobe cavitary lesion measuring up to 4.2 cm. Findings concerning for may be infectious, inflammatory or neoplastic in etiology.    CARDS:   #Atrial fibrillation     -Holding home xarelto 20mg QD    -Metoprolol 12.5 BID (takes 50mg ER at home)  #HLD    -continue rosuvastatin 20mg   #HTN    -Metoprolol 12.5 BID (takes 50mg ER at home)    -Holding home lasix 20mg   #CAD with CABG x3    f/u echo read      GASTROINTESTINAL/NUTRITION:   #Left retroperitoneal hematoma measuring approximately 7 x 3.8 x 8.3 cm  #left pelvic sidewall/Iliacus intramuscular hematoma measuring 7.1 x 4.7 x 9.6 cm with focus of contrast extravasation noted, consistent with active bleed    -s/p PCC in ED     -1U PRBC 1/23    -IR consult - no acute intervention   #Diet, NPO:   Except Medications    -aspiration precautions, HOB 30  #GI Prophylaxis    -not indicated  #Bowel regimen    -senna/miralax     -last bowel movement      /RENAL:   #urine output in critically ill    -indwelling cadet (placed 1/23)  #ALIYAH (baseline creatinine 1.2)    -f/u urine lytes   #Gout    -continue home allopurinol 100mg QD  #prostate cancer s/p prostatectomy     Labs          [01-23 @ 10:40]Na  142 // K  3.9 // Mg  1.9 // Phos  3.6          [01-23 @ 05:00]Na  140 // K  4.3 // Mg  2.0 // Phos  --         HEME/ONC:   #DVT prophylaxis     -Chemical: holding in the setting of active bleeding      -Mechanical: SCDs  #Left retroperitoneal hematoma measuring approximately 7 x 3.8 x 8.3 cm  #left pelvic sidewall/Iliacus intramuscular hematoma measuring 7.1 x 4.7 x 9.6 cm with focus of contrast extravasation noted, consistent with active bleed    -IR consult - no acute intervention     -s/p PCC in ED     -1U PRBC 1/23   #Afib on xarelto    -holding home Xarelto 20 mg oral tablet: 1 tab(s) orally once a day (at bedtime)       Labs: Hb/Hct:  9.4/29.5  (01-23 @ 05:00)  -->,  7.8/24.9  (01-23 @ 10:40)  -->                      Plts:  352  -->,  280  -->                 PTT/INR:  44.7/3.81  --->,  36.2/2.70  --->      T&S Expires: 1/26    ID:  #Monitor for leukocytosis   #DTI - negative  #UA negative   WBC- 15.53  --->>,  12.73  --->>  Temp trend- 24hrs T(F): 96.6 (01-23 @ 11:00), Max: 97.1 (01-23 @ 05:39)  Current antibiotics-not indicated       ENDOCRINE:   #Maintain euglycemia     -FSG q6 if NPO or Tube feeds    -Glucose goal 140-180. if above 180 start insulin sliding scale    MSK:  #Right hand and wrist abrasion / ecchymosis     -f/u RUE x rays      Activity - Bedrest:     Additional Instructions:  supine (01-23-25 @ 08:33)    LINES/DRAINS:  PIV, Cadet    ADVANCED DIRECTIVES:  Full Code    HCP/Emergency Contact- Dr. Servin - [phone]    INDICATION FOR SICU/SDU: Hemodynamic monitoring and q6h CBC in the setting of Left retroperitoneal hematoma and left pelvic sidewall/Iliacus intramuscular hematoma with active extravasation    DISPO:   Case discussed with attending Dr. Raygoza Assessment and Plan  89M with PMH of HTN, HLD, gout, CAD s/p CABGx4, afib on xarelto, lymphoma, prostate cancer s/p prostatectomy who presented s/p mechanical fall (+HT, -LOC, +AC) with left retroperitoneal hematoma and left pelvic sidewall/Iliacus intramuscular hematoma with active extravasation    NEUROLOGICAL:  #Acute pain    -APAP 650mg q6h    -Robaxin 500mg q8h    -Lidocaine patch     RESPIRATORY:   #Oxygenation    -saturating well on RA    -Encourage incentive spirometry - pulling 1.75L   #Activity    -increase as tolerated  #Right lower lobe cavitary lesion measuring up to 4.2 cm. Findings concerning for may be infectious, inflammatory or neoplastic in etiology.    CARDS:   #Atrial fibrillation     -Holding home xarelto 20mg QD    -Metoprolol 12.5 BID (takes 50mg ER at home)  #HLD    -continue rosuvastatin 20mg   #HTN    -Metoprolol 12.5 BID (takes 50mg ER at home)    -Holding home lasix 20mg   #CAD with CABG x3    f/u echo read      GASTROINTESTINAL/NUTRITION:   #Left retroperitoneal hematoma measuring approximately 7 x 3.8 x 8.3 cm  #left pelvic sidewall/Iliacus intramuscular hematoma measuring 7.1 x 4.7 x 9.6 cm with focus of contrast extravasation noted, consistent with active bleed    -s/p PCC in ED     -1U PRBC 1/23    -IR consult - no acute intervention   #Diet, NPO:   Except Medications    -aspiration precautions, HOB 30  #GI Prophylaxis    -not indicated  #Bowel regimen    -senna/miralax     -last bowel movement      /RENAL:   #urine output in critically ill    -indwelling cadet (placed 1/23)  #ALIYAH (baseline creatinine 1.2)    -urine lytes prerenal - 500cc LR ordered    #Gout    -continue home allopurinol 100mg QD  #prostate cancer s/p prostatectomy     Labs          [01-23 @ 10:40]Na  142 // K  3.9 // Mg  1.9 // Phos  3.6          [01-23 @ 05:00]Na  140 // K  4.3 // Mg  2.0 // Phos  --         HEME/ONC:   #DVT prophylaxis     -Chemical: holding in the setting of active bleeding      -Mechanical: SCDs  #Left retroperitoneal hematoma measuring approximately 7 x 3.8 x 8.3 cm  #left pelvic sidewall/Iliacus intramuscular hematoma measuring 7.1 x 4.7 x 9.6 cm with focus of contrast extravasation noted, consistent with active bleed    -IR consult - no acute intervention     -s/p PCC in ED     -1U PRBC 1/23   #Afib on xarelto    -holding home Xarelto 20 mg oral tablet: 1 tab(s) orally once a day (at bedtime)       Labs: Hb/Hct:  9.4/29.5  (01-23 @ 05:00)  -->,  7.8/24.9  (01-23 @ 10:40)  -->                      Plts:  352  -->,  280  -->                 PTT/INR:  44.7/3.81  --->,  36.2/2.70  --->      T&S Expires: 1/26    ID:  #Monitor for leukocytosis   #DTI - negative  #UA negative   WBC- 15.53  --->>,  12.73  --->>  Temp trend- 24hrs T(F): 96.6 (01-23 @ 11:00), Max: 97.1 (01-23 @ 05:39)  Current antibiotics-not indicated       ENDOCRINE:   #Maintain euglycemia     -FSG q6 if NPO or Tube feeds    -Glucose goal 140-180. if above 180 start insulin sliding scale    MSK:  #Right hand and wrist abrasion / ecchymosis     -f/u RUE x rays      Activity - Bedrest:     Additional Instructions:  supine (01-23-25 @ 08:33)    LINES/DRAINS:  PIV, Cadet    ADVANCED DIRECTIVES:  Full Code    HCP/Emergency Contact- Dr. Servin - [phone]    INDICATION FOR SICU/SDU: Hemodynamic monitoring and q6h CBC in the setting of Left retroperitoneal hematoma and left pelvic sidewall/Iliacus intramuscular hematoma with active extravasation    DISPO: SICU  Case discussed with attending Dr. Raygoza

## 2025-01-23 NOTE — ED ADULT NURSE NOTE - SUICIDE SCREENING QUESTION 2
No INTRACRANIAL MASS  CAPRINI VTE 2.0 SCORE [CLOT updated 2019]    AGE RELATED RISK FACTORS                                                       MOBILITY RELATED FACTORS  [ ] Age 41-60 years                                            (1 Point)                    [ ] Bed rest                                                        (1 Point)  [ x] Age: 61-74 years                                           (2 Points)                  [ ] Plaster cast                                                   (2 Points)  [ ] Age= 75 years                                              (3 Points)                    [ ] Bed bound for more than 72 hours                 (2 Points)    DISEASE RELATED RISK FACTORS                                               GENDER SPECIFIC FACTORS  [ ] Edema in the lower extremities                       (1 Point)              [ ] Pregnancy                                                     (1 Point)  [ ] Varicose veins                                               (1 Point)                     [ ] Post-partum < 6 weeks                                   (1 Point)             [x ] BMI > 25 Kg/m2                                            (1 Point)                     [ ] Hormonal therapy  or oral contraception          (1 Point)                 [ ] Sepsis (in the previous month)                        (1 Point)               [ ] History of pregnancy complications                 (1 point)  [ ] Pneumonia or serious lung disease                                               [ ] Unexplained or recurrent                     (1 Point)           (in the previous month)                               (1 Point)  [ ] Abnormal pulmonary function test                     (1 Point)                 SURGERY RELATED RISK FACTORS  [ ] Acute myocardial infarction                              (1 Point)               [ ]  Section                                             (1 Point)  [ ] Congestive heart failure (in the previous month)  (1 Point)      [ ] Minor surgery                                                  (1 Point)   [ ] Inflammatory bowel disease                             (1 Point)               [ ] Arthroscopic surgery                                        (2 Points)  [ ] Central venous access                                      (2 Points)                [x ] General surgery lasting more than 45 minutes (2 points)  [ ] Malignancy- Present or previous                   (2 Points)                [ ] Elective arthroplasty                                         (5 points)    [ ] Stroke (in the previous month)                          (5 Points)                                                                                                                                                           HEMATOLOGY RELATED FACTORS                                                 TRAUMA RELATED RISK FACTORS  [ ] Prior episodes of VTE                                     (3 Points)                [ ] Fracture of the hip, pelvis, or leg                       (5 Points)  [ ] Positive family history for VTE                         (3 Points)             [ ] Acute spinal cord injury (in the previous month)  (5 Points)  [ ] Prothrombin 58751 A                                     (3 Points)               [ ] Paralysis  (less than 1 month)                             (5 Points)  [ ] Factor V Leiden                                             (3 Points)                  [ ] Multiple Trauma within 1 month                        (5 Points)  [ ] Lupus anticoagulants                                     (3 Points)                                                           [ ] Anticardiolipin antibodies                               (3 Points)                                                       [ ] High homocysteine in the blood                      (3 Points)                                             [ ] Other congenital or acquired thrombophilia      (3 Points)                                                [ ] Heparin induced thrombocytopenia                  (3 Points)                                     Total Score [      5    ]

## 2025-01-23 NOTE — H&P ADULT - NSICDXPASTMEDICALHX_GEN_ALL_CORE_FT
PAST MEDICAL HISTORY:  CAD (coronary artery disease)     Chronic atrial fibrillation     Gout     HLD (hyperlipidemia)     HTN (hypertension)

## 2025-01-23 NOTE — H&P ADULT - NSHPLABSRESULTS_GEN_ALL_CORE
LABS  CBC (01-23 @ 05:00)                              9.4[L]                         15.53[H]  )----------------(  352        60.2  % Neutrophils, 28.1  % Lymphocytes, ANC: 9.36[H]                              29.5[L]    BMP (01-23 @ 05:00)             140     |  102     |  33[H] 		Ca++ --      Ca 8.5                ---------------------------------( 203[H]		Mg 2.0                4.3     |  20      |  1.9[H]			Ph --        LFTs (01-23 @ 05:00)      TPro 5.9[L] / Alb 3.3[L] / TBili 0.3 / DBili -- / AST 23 / ALT 10 / AlkPhos 85    Coags (01-23 @ 05:00)  aPTT 44.7[H] / INR 3.81[H] / PT >40.00[H]    --------------------------------------------------------------------------------------------    MICROBIOLOGY  Urinalysis (01-23 @ 05:00):     Color:  / Appearance:  / SG:  / pH:  / Gluc: 203[H] / Ketones:  / Bili:  / Urobili:  / Protein : / Nitrites:  / Leuk.Est:  / RBC:  / WBC:  / Sq Epi:  / Non Sq Epi:  / Bacteria        --------------------------------------------------------------------------------------------    I&O's Summary    IMAGING:  < from: CT Head No Cont (01.23.25 @ 05:28) >  IMPRESSION:  CT HEAD:  No acute intracranial findings.  CT CERVICAL SPINE:  No acute cervical fracture or facet subluxation.    < from: CT Chest w/ IV Cont (01.23.25 @ 05:31) >  IMPRESSION:  1. Left retroperitoneal hematoma measuring approximately 7 x 3.8 x 8.3   cm. Additional left pelvic sidewall/Iliacus intramuscular hematoma   measuring 7.1 x 4.7 x 9.6 cm with focus of contrast extravasation noted,   consistent with active bleed.  2. Right lower lobe cavitary lesion measuring up to 4.2 cm. Findings   concerning for may be infectious, inflammatory or neoplastic in etiology.   Further evaluation recommended.

## 2025-01-23 NOTE — ED PROVIDER NOTE - WR ORDER NAME 3
8/8/2024      Teddy Shah  [address] [facility] [address]      Dear Colleague,    Thank you for referring your patient, Teddy Shah, to the [facility]. Please see a copy of my visit note below.    PATIENT HISTORY:  Teddy Shah is a 55 year old male who presents to clinic as a self referral with a chief complaint of right greater toe/ball of the foot.  The patient is seen by themselves.  The patient relates the pain is primarily located around the ball of the foot.  Reports insidious onset without acute precipitating event.  The patient relates that the symptoms have been going on for several year(s).  The patient has previously tried different shoes, wider shoes, various inserts with little relief.  The patient is currently employed as software training .  Any previous notes and studies that pertain to the patient's condition were reviewed.    Pertinent medical, surgical and family history was reviewed in the Epic chart.    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     Diabetes mellitus, type 2 (H) 5/2/2021     HTN (hypertension)      Hypercholesteremia        Medications:   Current Outpatient Medications:      ALLEGRA ALLERGY 180 MG tablet, TAKE 1 TABLET DAILY AS NEEDED FOR ALLERGIES, Disp: 90 tablet, Rfl: 1     amLODIPine (NORVASC) 5 MG tablet, TAKE 1 TABLET DAILY FOR BLOOD PRESSURE, Disp: 90 tablet, Rfl: 1     atorvastatin (LIPITOR) 20 MG tablet, TAKE 1 TABLET DAILY FOR CHOLESTEROL, Disp: 90 tablet, Rfl: 1     hydrALAZINE (APRESOLINE) 25 MG tablet, Take 1 tablet (25 mg) by mouth 2 times daily, Disp: 180 tablet, Rfl: 1     losartan-hydrochlorothiazide (HYZAAR) 100-25 MG tablet, For blood pressure in AM, Disp: 90 tablet, Rfl: 1     magnesium oxide 400 MG CAPS, , Disp: , Rfl:      metFORMIN  "(GLUCOPHAGE XR) 500 MG 24 hr tablet, Take 1 tablet (500 mg) by mouth daily (with dinner) TAKE 1 TABLET DAILY WITH DINNER FOR DIABETES, Disp: 90 tablet, Rfl: 1     Allergies:  No Known Allergies    Vitals: BP (!) 158/73   Ht 1.892 m (6' 2.5\")   Wt 100.2 kg (221 lb)   BMI 28.00 kg/m    BMI= Body mass index is 28 kg/m .    LOWER EXTREMITY PHYSICAL EXAM    Dermatologic: Skin is intact to right lower extremity without significant lesions, rash or abrasion.        Vascular: DP & PT pulses are intact & regular on the right.   CFT and skin temperature is normal to the right lower extremity.     Neurologic: Lower extremity sensation is intact to light touch.  No evidence of weakness in the right lower extremity.        Musculoskeletal: Patient is ambulatory without assistive device or brace.  No gross ankle deformity noted.  No foot or ankle joint effusion is noted.  Noted pain on palpation of the sesamoids on the right foot.  Noted pain with maximum dorsiflexion against resistance of the first metatarsophalangeal joint on the right.    Diagnostics:  Radiographs included three views of the right foot demonstrating fracture of the tibial sesamoid  with no cortical erosions or periosteal elevation.  All joint margins appear stable.  There is no apparent fracture or tumor formation noted.  There is no evidence of foreign body.  The images were independently reviewed by myself along with the patient explaining the findings.      ASSESSMENT / PLAN:     ICD-10-CM    1. Chronic foot pain, right  M79.671 XR Foot Right G/E 3 Views    G89.29       2. Sesamoiditis of right foot  M25.871           I have explained to Teddy about the conditions.  We discussed the underlying contributing factors to the condition as well as both conservative and surgical treatment options along with expected length of recovery.  At this time, the patient was educated on the importance of offloading supportive shoes and other devices.  I demonstrated to " the patient calf stretches to perform every hour daily until symptoms resolve.  After symptoms resolve, the patient was advised to perform the stretches 3 times daily to prevent future recurrence.  The patient was instructed to perform warm soaks with Epson salt after which to also apply over-the-counter Voltaren gel to deeply massage the injured tissue.  The patient was instructed to do this on a daily basis until symptoms resolve.    The patient may return in four weeks for reevaluation to determine if any further treatment will be needed.      Teddy verbalized agreement with and understanding of the rational for the diagnosis and treatment plan.  All questions were answered to best of my ability and the patient's satisfaction. The patient was advised to contact the clinic with any questions that may arise after the clinic visit.      Disclaimer: This note consists of symbols derived from keyboarding, dictation and/or voice recognition software. As a result, there may be errors in the script that have gone undetected. Please consider this when interpreting information found in this chart.       EFRAIN Ragland D.P.M., F.A.C.F.A.S.      Again, thank you for allowing me to participate in the care of your patient.        Sincerely,        Ahsan Ragland DPM   Xray Chest 1 View-PORTABLE IMMEDIATE

## 2025-01-23 NOTE — CONSULT NOTE ADULT - SUBJECTIVE AND OBJECTIVE BOX
LYLA RICHARD   [id number]   [birth year]  89yM    Admit Date: 01-23-25  Indication for SDU/SICU:         ============================  HPI       SICU Consult for Hemodynamic monitoring and q6h CBC in the setting of Left retroperitoneal hematoma and left pelvic sidewall/Iliacus intramuscular hematoma with active extravasation      Pertinent Imaging    CT Chest, Abdomen and Pelvis w/ IV Cont (01.23.25 @ 05:31):   1. Left retroperitoneal hematoma measuring approximately 7 x 3.8 x 8.3 cm. Additional left pelvic sidewall/Iliacus intramuscular hematoma measuring 7.1 x 4.7 x 9.6 cm with focus of contrast extravasation noted, consistent with active bleed.  2. Right lower lobe cavitary lesion measuring up to 4.2 cm. Findings concerning for may be infectious, inflammatory or neoplastic in etiology. Further evaluation recommended.    CT HEAD:  No acute intracranial findings.    CT CERVICAL SPINE:  No acute cervical fracture or facet subluxation.    Xray Hip 2-3 Views, Left (01.23.25 @ 04:57) >  No evidence of acute osseous abnormality.       24 Hour Events  -Admission under SICU service    [X] A ten-point review of systems was otherwise negative except as noted above.  [  ] Due to altered mental status/intubation, subjective information was not attained from the patient. History was obtained, to the extent possible, from review of the chart and collateral sources of information.    =========================================================================================================================================      PMH  PAST MEDICAL & SURGICAL HISTORY:  HTN (hypertension)  HLD (hyperlipidemia)  Gout  CAD (coronary artery disease)  Chronic atrial fibrillation  S/P CABG x 4  History of repair of left hip joint    Home Meds:  Home Medications:  allopurinol 100 mg oral tablet: 1 tab(s) orally once a day (23 Jan 2025 08:35)  furosemide 20 mg oral tablet: 1 tab(s) orally once a day (23 Jan 2025 08:35)  Metoprolol Succinate ER 50 mg oral tablet, extended release: 1 tab(s) orally once a day (23 Jan 2025 08:35)  rosuvastatin 20 mg oral tablet: 1 tab(s) orally once a day (at bedtime) (23 Jan 2025 08:35)  Xarelto 20 mg oral tablet: 1 tab(s) orally once a day (at bedtime) (23 Jan 2025 08:35)     Allergies  Allergies    No Known Allergies    Intolerances       Current Medications:  acetaminophen     Tablet .. 650 milliGRAM(s) Oral every 6 hours  allopurinol 100 milliGRAM(s) Oral daily  chlorhexidine 2% Cloths 1 Application(s) Topical <User Schedule>  cyclobenzaprine 5 milliGRAM(s) Oral every 8 hours  lactated ringers. 1000 milliLiter(s) (120 mL/Hr) IV Continuous <Continuous>  lidocaine   4% Patch 1 Patch Transdermal daily  metoprolol tartrate 25 milliGRAM(s) Oral two times a day  polyethylene glycol 3350 17 Gram(s) Oral daily  rosuvastatin 20 milliGRAM(s) Oral at bedtime  senna 2 Tablet(s) Oral at bedtime      Vital Sings, Intake/Output (Last 24Hours)  ICU Vital Signs Last 24 Hrs  T(C): 35.7 (23 Jan 2025 06:50), Max: 36.2 (23 Jan 2025 05:39)  T(F): 96.3 (23 Jan 2025 06:50), Max: 97.1 (23 Jan 2025 05:39)  HR: 55 (23 Jan 2025 07:32) (55 - 99)  BP: 122/53 (23 Jan 2025 07:32) (88/46 - 140/61)  RR: 18 (23 Jan 2025 07:32) (18 - 18)  SpO2: 98% (23 Jan 2025 07:32) (94% - 98%)    O2 Parameters below as of 23 Jan 2025 06:50  Patient On (Oxygen Delivery Method): room air          I&O's Summary      Physical Exam:  ----------------------------------------------------------------------------------------------------------  PHYSICAL EXAM:    General: Pt lying comfortably in bed.     Neuro:  GCS:     = E   / V   / M      Neuro: Alert & oriented x 3, no focal deficits. CNs II-XII intact. PERRLA, EOMs intact. Strength 5/5 throughout, sensory to light touch intact.     Lungs: Clear to auscultation bilaterally, normal expansion/effort. Oxygen delivery: ** . Pulling ** on IS.  Vent:     Cardiovascular : S1, S2.  Regular rate and rhythm. Cardiac Rhythm: NSR  Peripheral edema:     GI: BS x 4. Abdomen soft, Non-tender, Non-distended. Gastrostomy / Jejunostomy tube in place, dressing c/d/i.  Nasogastric tube in place.  Colostomy / Ileostomy.      Wound: ***    Extremities: Extremities warm, pink, well-perfused.        - Pulse exam: Radial palpable/ dopplerable bilaterally. DP/PT ** palpable/ dopplerable bilaterally.     Derm: Good skin turgor, no skin breakdown.       - DTI: ***    : Machado catheter in place/voiding freely.     Tubes/Lines/Drains   ----------------------------------------------------------------------------------------------------------  [x] Peripheral IV  [ ] Urinary Catheter Machado                                               [ ] Central Venous Line                   [ ] Arterial Line		       LYLA RICHARD   [id number]   [birth year]  89yM    Admit Date: 01-23-25  Indication for SDU/SICU: Hemodynamic monitoring and q6h CBC in the setting of Left retroperitoneal hematoma and left pelvic sidewall/Iliacus intramuscular hematoma with active extravasation        ============================  HPI   89M with PMH of HTN, HLD, gout, CAD s/p CABGx4, afib on xarelto presents to the ED s/p mechanical fall (+HT, -LOC, +AC). Patient lives at home with family next door and ambulates independently or with a walker at baseline. Patient got up to use the bathroom around 3am this morning when he rounded a corner and fell on top of his walker, hitting his left abdomen on the walker as he went down. Patient was unable to get up on his own. Family was nearby and was able to assist him up. Patient reported abdominal pain, presented to [facility] and received a panscan revealing retroperitoneal hemorrhage. Patient transferred to [facility] for further management. ABCs intact, GCS 15. External signs of trauma: right temple abrasion, right finger abrasions, right wrist hematoma.     SICU Consult for Hemodynamic monitoring and q6h CBC in the setting of Left retroperitoneal hematoma and left pelvic sidewall/Iliacus intramuscular hematoma with active extravasation. Patient was seen and examined, hemodynamically stable, afib rate-controlled. AAOx3, GCS 15, following commands. Complaining of mild abdominal pain and left hip pain.       Pertinent Imaging    CT Chest, Abdomen and Pelvis w/ IV Cont (01.23.25 @ 05:31):   1. Left retroperitoneal hematoma measuring approximately 7 x 3.8 x 8.3 cm. Additional left pelvic sidewall/Iliacus intramuscular hematoma measuring 7.1 x 4.7 x 9.6 cm with focus of contrast extravasation noted, consistent with active bleed.  2. Right lower lobe cavitary lesion measuring up to 4.2 cm. Findings concerning for may be infectious, inflammatory or neoplastic in etiology. Further evaluation recommended.    CT HEAD:  No acute intracranial findings.    CT CERVICAL SPINE:  No acute cervical fracture or facet subluxation.    Xray Hip 2-3 Views, Left (01.23.25 @ 04:57) >  No evidence of acute osseous abnormality.       24 Hour Events  -Admission under SICU service    [X] A ten-point review of systems was otherwise negative except as noted above.  [  ] Due to altered mental status/intubation, subjective information was not attained from the patient. History was obtained, to the extent possible, from review of the chart and collateral sources of information.    =========================================================================================================================================      PMH  PAST MEDICAL & SURGICAL HISTORY:  HTN (hypertension)  HLD (hyperlipidemia)  Gout  CAD (coronary artery disease)  Chronic atrial fibrillation  S/P CABG x 4  History of repair of left hip joint    Home Meds:  Home Medications:  allopurinol 100 mg oral tablet: 1 tab(s) orally once a day (23 Jan 2025 08:35)  furosemide 20 mg oral tablet: 1 tab(s) orally once a day (23 Jan 2025 08:35)  Metoprolol Succinate ER 50 mg oral tablet, extended release: 1 tab(s) orally once a day (23 Jan 2025 08:35)  rosuvastatin 20 mg oral tablet: 1 tab(s) orally once a day (at bedtime) (23 Jan 2025 08:35)  Xarelto 20 mg oral tablet: 1 tab(s) orally once a day (at bedtime) (23 Jan 2025 08:35)     Allergies  Allergies    No Known Allergies    Intolerances       Current Medications:  acetaminophen     Tablet .. 650 milliGRAM(s) Oral every 6 hours  allopurinol 100 milliGRAM(s) Oral daily  chlorhexidine 2% Cloths 1 Application(s) Topical <User Schedule>  cyclobenzaprine 5 milliGRAM(s) Oral every 8 hours  lactated ringers. 1000 milliLiter(s) (120 mL/Hr) IV Continuous <Continuous>  lidocaine   4% Patch 1 Patch Transdermal daily  metoprolol tartrate 25 milliGRAM(s) Oral two times a day  polyethylene glycol 3350 17 Gram(s) Oral daily  rosuvastatin 20 milliGRAM(s) Oral at bedtime  senna 2 Tablet(s) Oral at bedtime      Vital Sings, Intake/Output (Last 24Hours)  ICU Vital Signs Last 24 Hrs  T(C): 35.7 (23 Jan 2025 06:50), Max: 36.2 (23 Jan 2025 05:39)  T(F): 96.3 (23 Jan 2025 06:50), Max: 97.1 (23 Jan 2025 05:39)  HR: 55 (23 Jan 2025 07:32) (55 - 99)  BP: 122/53 (23 Jan 2025 07:32) (88/46 - 140/61)  RR: 18 (23 Jan 2025 07:32) (18 - 18)  SpO2: 98% (23 Jan 2025 07:32) (94% - 98%)    O2 Parameters below as of 23 Jan 2025 06:50  Patient On (Oxygen Delivery Method): room air      I&O's Summary      Physical Exam:  ----------------------------------------------------------------------------------------------------------  PHYSICAL EXAM:    General: Pt lying comfortably in bed.   Neuro: GCS:  15, Alert & oriented x 4, no focal deficits. CNs II-XII intact. PERRLA, EOMs intact. Strength 5/5 throughout, sensory to light touch intact.   HENMT: head normocephalic with seborrhea with irritation, has right supraorbital laceration and infraorbital ecchymosis with no orbital rim step-off or tenderness, PERRLA, EOMI, Oral mucosa with moist membranes. Normal dentition; no pharyngeal injection or exudates.   Lungs: Clear to auscultation bilaterally, normal expansion/effort on room air. Pulling 2L on IS.    Cardiovascular : S1, S2.  atrial fibrillation, rate controlled.   GI: Soft, mild left sided tenderness, left lower flank tenderness, no rebound, no guarding, non-distended.  Extremities: Extremities warm, pink, well-perfused. Right hand with abrasions and ecchymosis  Derm: Good skin turgor, no skin breakdown. Scalp with seborrhea with irritation  : Machado catheter in place.     Tubes/Lines/Drains   ----------------------------------------------------------------------------------------------------------  [x] Peripheral IV  [x] Urinary Catheter Machado                                               [ ] Central Venous Line                   [ ] Arterial Line		          CBC Full  -  ( 23 Jan 2025 10:40 )                        7.8    12.73 )-----------( 280      ( 23 Jan 2025 10:40 )             24.9       142  |  106  |  33[H]  ----------------------------<  156[H]  3.9   |  23  |  1.8[H]    Ca    8.6      23 Jan 2025 10:40  Phos  3.6     01-23  Mg     1.9     01-23    TPro  5.9[L]  /  Alb  3.3[L]  /  TBili  0.3  /  DBili  x   /  AST  23  /  ALT  10  /  AlkPhos  85  01-23    LIVER FUNCTIONS - ( 23 Jan 2025 05:00 )  Alb: 3.3 g/dL / Pro: 5.9 g/dL / ALK PHOS: 85 U/L / ALT: 10 U/L / AST: 23 U/L / GGT: x           CAPILLARY BLOOD GLUCOSE      POCT Blood Glucose.: 156 mg/dL (23 Jan 2025 08:04)    Urinalysis Basic - ( 23 Jan 2025 12:00 )    Color: Yellow / Appearance: Clear / SG: >1.030 / pH: x  Gluc: x / Ketone: Trace mg/dL  / Bili: Negative / Urobili: 1.0 mg/dL   Blood: x / Protein: 100 mg/dL / Nitrite: Negative   Leuk Esterase: Negative / RBC: x / WBC x   Sq Epi: x / Non Sq Epi: x / Bacteria: x      PT/INR - ( 23 Jan 2025 10:40 )   PT: 32.50 sec;   INR: 2.70 ratio         PTT - ( 23 Jan 2025 10:40 )  PTT:36.2 sec

## 2025-01-23 NOTE — CONSULT NOTE ADULT - ASSESSMENT
IMPRESSION: Rehab of gait dysfunction / s/p fall with retroperitoneal bleed / HTN, HLD, gout, CAD s/p CABGx4, afib on xarelto    PRECAUTIONS: [   ] Cardiac  [   ] Respiratory  [   ] Seizures [   ] Contact Isolation  [   ] Droplet Isolation  [   ] Other    Weight Bearing Status:     RECOMMENDATION:    Out of Bed to Chair     DVT/Decubiti Prophylaxis    REHAB PLAN:     [  x  ] Bedside P/T 3-5 times a week   [    ]   Bedside O/T  2-3 times a week             [    ] Speech Therapy               [    ]  No Rehab Therapy Indicated   Conditioning/ROM                                    ADL  Bed Mobility                                               Conditioning/ROM  Transfers                                                     Bed Mobility  Sitting /Standing Balance                         Transfers                                        Gait Training                                               Sitting/Standing Balance  Stair Training [   ]Applicable                    Home equipment Eval                                                                        Splinting  [   ] Only      GOALS:   ADL   [    ]   Independent                    Transfers  [ x   ] Independent                          Ambulation  [ x   ] Independent     [  x   ] With device                            [    ]  CG                                                         [    ]  CG                                                                  [    ] CG                            [    ] Min A                                                   [    ] Min A                                                              [    ] Min  A          DISCHARGE PLAN:   [    ]  Good candidate for Intensive Rehabilitation/Hospital based                                             Will tolerate 3hrs Intensive Rehab Daily                                       [  x   ]  Short Term Rehab in Skilled Nursing Facility                             vs          [   x  ]  Home with Outpatient or  services                                         [     ]  Possible Candidate for Intensive Hospital based Rehab

## 2025-01-23 NOTE — ED PROVIDER NOTE - WHICH SHOWED
EKG atrial fibrillation with ventricular response of 56, normal QTc no ST elevation; chest x-ray no infiltrate no effusion no pneumothorax no rib fracture, pelvis x-ray no fracture, left hip x-ray no fracture no dislocation hardware intact status post THR

## 2025-01-23 NOTE — CONSULT NOTE ADULT - SUBJECTIVE AND OBJECTIVE BOX
HPI:  89M with PMH of HTN, HLD, gout, CAD s/p CABGx4, afib on xarelto presents to the ED s/p mechanical fall (+HT, -LOC, +AC). Patient lives at home with family next door and ambulates with a walker at baseline. Patient got up to use the bathroom around 3am this morning when he rounded a corner and fell on top of his walker, hitting his left abdomen on the walker as he went down. Patient was unable to get up on his own. Family was nearby and was able to assist him up. Patient reported abdominal pain, presented to [facility] and received a panscan revealing retroperitoneal hemorrhage. Patient transferred to [facility] for further management. ABCs intact, GCS 15. External signs of trauma: right temple abrasion, right finger abrasions     #Left retroperitoneal hematoma measuring approximately 7 x 3.8 x 8.3 cm  #left pelvic sidewall/Iliacus intramuscular hematoma measuring 7.1 x 4.7 x 9.6 cm with focus of contrast extravasation noted, consistent with active bleed    -s/p PCC in ED     -1U PRBC 1/23    -IR consult - no acute intervention       PAST MEDICAL & SURGICAL HISTORY:  HTN (hypertension)      HLD (hyperlipidemia)      Gout      CAD (coronary artery disease)      Chronic atrial fibrillation      S/P CABG x 4      History of repair of left hip joint          Hospital Course:    TODAY'S SUBJECTIVE & REVIEW OF SYMPTOMS:     Constitutional WNL   Cardio WNL   Resp WNL   GI WNL  Heme WNL  Endo WNL  Skin WNL  MSK abd pain   Neuro WNL  Cognitive WNL  Psych WNL      MEDICATIONS  (STANDING):  acetaminophen     Tablet .. 650 milliGRAM(s) Oral every 6 hours  allopurinol 100 milliGRAM(s) Oral daily  chlorhexidine 2% Cloths 1 Application(s) Topical <User Schedule>  lactated ringers. 1000 milliLiter(s) (120 mL/Hr) IV Continuous <Continuous>  lidocaine   4% Patch 1 Patch Transdermal daily  methocarbamol 500 milliGRAM(s) Oral every 8 hours  metoprolol tartrate 12.5 milliGRAM(s) Oral every 12 hours  polyethylene glycol 3350 17 Gram(s) Oral daily  prothrombin complex concentrate human-lans IVPB (BALFAXAR) 1000 International Unit(s) IV Intermittent once  rosuvastatin 20 milliGRAM(s) Oral at bedtime  senna 2 Tablet(s) Oral at bedtime    MEDICATIONS  (PRN):      FAMILY HISTORY:      Allergies    No Known Allergies    Intolerances        SOCIAL HISTORY:    [  ] Etoh  [  ] Smoking  [  ] Substance abuse     Home Environment:  [   ] Home Alone  [ x  ] Lives with Family  [   ] Home Health Aid    Dwelling:  [   ] Apartment  [  x ] Private House  [   ] Adult Home  [   ] Skilled Nursing Facility      [   ] Short Term  [   ] Long Term  [ x  ] Stairs       Elevator [   ]    FUNCTIONAL STATUS PTA: (Check all that apply)  Ambulation: [ x   ]Independent    [   ] Dependent     [   ] Non-Ambulatory  Assistive Device: [   ] SA Cane  [   ]  Q Cane  [ x  ] Walker  [   ]  Wheelchair  ADL : [ x  ] Independent  [    ]  Dependent       Vital Signs Last 24 Hrs  T(C): 35.9 (23 Jan 2025 16:00), Max: 36.2 (23 Jan 2025 05:39)  T(F): 96.6 (23 Jan 2025 16:00), Max: 97.1 (23 Jan 2025 05:39)  HR: 71 (23 Jan 2025 16:00) (55 - 99)  BP: 115/56 (23 Jan 2025 16:00) (88/46 - 140/61)  BP(mean): 81 (23 Jan 2025 16:00) (78 - 93)  RR: 20 (23 Jan 2025 16:00) (16 - 20)  SpO2: 100% (23 Jan 2025 16:00) (94% - 100%)    Parameters below as of 23 Jan 2025 16:00  Patient On (Oxygen Delivery Method): room air          PHYSICAL EXAM: Awake & Alert  GENERAL: NAD  HEAD:  Normocephalic  CHEST/LUNG: Clear   HEART: S1S2+  ABDOMEN: Soft, Nontender  EXTREMITIES:  no calf tenderness    NERVOUS SYSTEM:  Cranial Nerves 2-12 intact [   ] Abnormal  [   ]  ROM: WFL all extremities [ x  ]  Abnormal [   ]  Motor Strength: WFL all extremities  [ x  ]  Abnormal [   ]  Sensation: intact to light touch [  x ] Abnormal [   ]    FUNCTIONAL STATUS:  Bed Mobility: Independent [   ]  Supervision [   ]  Needs Assistance [ x  ]  N/A [   ]  Transfers: Independent [   ]  Supervision [   ]  Needs Assistance [   ]  N/A [   ]   Ambulation: Independent [   ]  Supervision [   ]  Needs Assistance [   ]  N/A [   ]  ADL: Independent [   ] Requires Assistance [   ] N/A [   ]      LABS:                        7.8    12.73 )-----------( 280      ( 23 Jan 2025 10:40 )             24.9     01-23    142  |  106  |  33[H]  ----------------------------<  156[H]  3.9   |  23  |  1.8[H]    Ca    8.6      23 Jan 2025 10:40  Phos  3.6     01-23  Mg     1.9     01-23    TPro  5.9[L]  /  Alb  3.3[L]  /  TBili  0.3  /  DBili  x   /  AST  23  /  ALT  10  /  AlkPhos  85  01-23    PT/INR - ( 23 Jan 2025 10:40 )   PT: 32.50 sec;   INR: 2.70 ratio         PTT - ( 23 Jan 2025 10:40 )  PTT:36.2 sec  Urinalysis Basic - ( 23 Jan 2025 12:00 )    Color: Yellow / Appearance: Clear / SG: >1.030 / pH: x  Gluc: x / Ketone: Trace mg/dL  / Bili: Negative / Urobili: 1.0 mg/dL   Blood: x / Protein: 100 mg/dL / Nitrite: Negative   Leuk Esterase: Negative / RBC: 1 /HPF / WBC 3 /HPF   Sq Epi: x / Non Sq Epi: 4 /HPF / Bacteria: Negative /HPF        RADIOLOGY & ADDITIONAL STUDIES:

## 2025-01-23 NOTE — ED PROVIDER NOTE - CLINICAL SUMMARY MEDICAL DECISION MAKING FREE TEXT BOX
Received pt as a transfer from , 89yo man h/o HTN, afib on xarelto who sustained retroperitoneal hemorrhage in a fall PTA. Head trauma but otherwise neg imaging. On arrival pt is alert and pleasantly conversant. Exam and workup as noted. Trauma team at bedside, plan is to reverse xarelto w [facility] and admit SICU. Oriented - self; Oriented - place; Oriented - time

## 2025-01-23 NOTE — CONSULT NOTE ADULT - SUBJECTIVE AND OBJECTIVE BOX
Patient is a 89y old  Male who presents with a chief complaint of Retroperitoneal Hematoma (23 Jan 2025 09:48)      HPI:  89M with PMH of HTN, HLD, gout, CAD s/p CABGx4, afib on xarelto, hx of lymphoma and prostate cancer  presents to the ED s/p mechanical fall (+HT, -LOC, +AC). Patient lives at home with family next door and ambulates with a walker at baseline. Patient got up to use the bathroom around 3am this morning when he rounded a corner and fell on top of his walker, hitting his left abdomen on the walker as he went down. Patient was unable to get up on his own. Family was nearby and was able to assist him up. Patient reported abdominal pain, presented to [facility] and received a panscan revealing retroperitoneal hemorrhage. Patient transferred to [facility] for further management. ABCs intact, GCS 15. External signs of trauma: right temple abrasion, right finger abrasions (23 Jan 2025 08:37)      PAST MEDICAL & SURGICAL HISTORY:  HTN (hypertension)      HLD (hyperlipidemia)      Gout      CAD (coronary artery disease)      Chronic atrial fibrillation      S/P CABG x 4      History of repair of left hip joint          SOCIAL HX:   Smoking                             FAMILY HISTORY:  .  No cardiovascular or pulmonary family history     REVIEW OF SYSTEMS:    All ROS are negative exept per HPI       Allergies    No Known Allergies    Intolerances          PHYSICAL EXAM  Vital Signs Last 24 Hrs  T(C): 35.9 (23 Jan 2025 11:00), Max: 36.2 (23 Jan 2025 05:39)  T(F): 96.6 (23 Jan 2025 11:00), Max: 97.1 (23 Jan 2025 05:39)  HR: 65 (23 Jan 2025 14:00) (55 - 99)  BP: 109/54 (23 Jan 2025 14:00) (88/46 - 140/61)  BP(mean): 78 (23 Jan 2025 14:00) (78 - 93)  RR: 16 (23 Jan 2025 14:00) (16 - 20)  SpO2: 99% (23 Jan 2025 14:00) (94% - 100%)    Parameters below as of 23 Jan 2025 06:50  Patient On (Oxygen Delivery Method): room air        CONSTITUTIONAL:  Well nourished.  NAD    ENT:   Airway patent,   No thrush    EYES:   Clear bilaterally,   pupils equal,   round and reactive to light.    CARDIAC:   Normal rate,   regular rhythm.    no edema      RESPIRATORY:   No wheezing   Normal chest expansion  Not tachypneic,  No use of accessory muscles    GASTROINTESTINAL:  Abdomen soft, non-tender,   No guarding,   Positive BS    MUSCULOSKELETAL:   Range of motion is not limited,  No clubbing, cyanosis    NEUROLOGICAL:   Alert and oriented   No motor deficits.    SKIN:   Skin normal color for race,   No evidence of rash.      HEME LYMPH:   No cervical  lymphadenopathy.  no inguinal lymphadenopathy          LABS:                          7.8    12.73 )-----------( 280      ( 23 Jan 2025 10:40 )             24.9                                               01-23    142  |  106  |  33[H]  ----------------------------<  156[H]  3.9   |  23  |  1.8[H]    Ca    8.6      23 Jan 2025 10:40  Phos  3.6     01-23  Mg     1.9     01-23    TPro  5.9[L]  /  Alb  3.3[L]  /  TBili  0.3  /  DBili  x   /  AST  23  /  ALT  10  /  AlkPhos  85  01-23      PT/INR - ( 23 Jan 2025 10:40 )   PT: 32.50 sec;   INR: 2.70 ratio         PTT - ( 23 Jan 2025 10:40 )  PTT:36.2 sec                                       Urinalysis Basic - ( 23 Jan 2025 12:00 )    Color: Yellow / Appearance: Clear / SG: >1.030 / pH: x  Gluc: x / Ketone: Trace mg/dL  / Bili: Negative / Urobili: 1.0 mg/dL   Blood: x / Protein: 100 mg/dL / Nitrite: Negative   Leuk Esterase: Negative / RBC: 1 /HPF / WBC 3 /HPF   Sq Epi: x / Non Sq Epi: 4 /HPF / Bacteria: Negative /HPF                                                  LIVER FUNCTIONS - ( 23 Jan 2025 05:00 )  Alb: 3.3 g/dL / Pro: 5.9 g/dL / ALK PHOS: 85 U/L / ALT: 10 U/L / AST: 23 U/L / GGT: x                                                                                                MEDICATIONS  (STANDING):  acetaminophen     Tablet .. 650 milliGRAM(s) Oral every 6 hours  allopurinol 100 milliGRAM(s) Oral daily  chlorhexidine 2% Cloths 1 Application(s) Topical <User Schedule>  lactated ringers. 1000 milliLiter(s) (120 mL/Hr) IV Continuous <Continuous>  lidocaine   4% Patch 1 Patch Transdermal daily  methocarbamol 500 milliGRAM(s) Oral every 8 hours  metoprolol tartrate 12.5 milliGRAM(s) Oral every 12 hours  polyethylene glycol 3350 17 Gram(s) Oral daily  prothrombin complex concentrate human-lans IVPB (BALFAXAR) 1000 International Unit(s) IV Intermittent once  rosuvastatin 20 milliGRAM(s) Oral at bedtime  senna 2 Tablet(s) Oral at bedtime    MEDICATIONS  (PRN):       CT Chest w/ IV Cont (01.23.25 @ 05:31) >  IMPRESSION:    1. Left retroperitoneal hematoma measuring approximately 7 x 3.8 x 8.3   cm. Additional left pelvic sidewall/Iliacus intramuscular hematoma   measuring 7.1 x 4.7 x 9.6 cm with focus of contrast extravasation noted,   consistent with active bleed.    2. Right lower lobe cavitary lesion measuring up to 4.2 cm. Findings   concerning for may be infectious, inflammatory or neoplastic in etiology.   Further evaluation recommended.    < end of copied text >   Patient is a 89y old  Male who presents with a chief complaint of Retroperitoneal Hematoma (23 Jan 2025 09:48)      HPI:  89M with PMH of HTN, HLD, gout, CAD s/p CABGx4, afib on xarelto, hx of lymphoma and prostate cancer  presents to the ED s/p mechanical fall (+HT, -LOC, +AC). Patient lives at home with family next door and ambulates with a walker at baseline. Patient got up to use the bathroom around 3am this morning when he rounded a corner and fell on top of his walker, hitting his left abdomen on the walker as he went down. Patient was unable to get up on his own. Family was nearby and was able to assist him up. Patient reported abdominal pain, presented to [facility] and received a panscan revealing retroperitoneal hemorrhage. Patient transferred to [facility] for further management. ABCs intact, GCS 15. External signs of trauma: right temple abrasion, right finger abrasions (23 Jan 2025 08:37)      PAST MEDICAL & SURGICAL HISTORY:  HTN (hypertension)      HLD (hyperlipidemia)      Gout      CAD (coronary artery disease)      Chronic atrial fibrillation      S/P CABG x 4      History of repair of left hip joint          SOCIAL HX:   Smoking    heavy smoker, pipe and cigar                          FAMILY HISTORY:  .  No cardiovascular or pulmonary family history     REVIEW OF SYSTEMS:    All ROS are negative exept per HPI       Allergies    No Known Allergies    Intolerances          PHYSICAL EXAM  Vital Signs Last 24 Hrs  T(C): 35.9 (23 Jan 2025 11:00), Max: 36.2 (23 Jan 2025 05:39)  T(F): 96.6 (23 Jan 2025 11:00), Max: 97.1 (23 Jan 2025 05:39)  HR: 65 (23 Jan 2025 14:00) (55 - 99)  BP: 109/54 (23 Jan 2025 14:00) (88/46 - 140/61)  BP(mean): 78 (23 Jan 2025 14:00) (78 - 93)  RR: 16 (23 Jan 2025 14:00) (16 - 20)  SpO2: 99% (23 Jan 2025 14:00) (94% - 100%)    Parameters below as of 23 Jan 2025 06:50  Patient On (Oxygen Delivery Method): room air        CONSTITUTIONAL:  elderly not in distress     ENT:   Airway patent,   No thrush    EYES:   Clear bilaterally,   pupils equal,   round and reactive to light.  hematoma noted on right face    CARDIAC:   Normal rate,   regular rhythm.    no edema      RESPIRATORY:   No wheezing   Normal chest expansion  Not tachypneic,  No use of accessory muscles    GASTROINTESTINAL:  Abdomen soft, non-tender,   No guarding,   Positive BS        LABS:                          7.8    12.73 )-----------( 280      ( 23 Jan 2025 10:40 )             24.9                                               01-23    142  |  106  |  33[H]  ----------------------------<  156[H]  3.9   |  23  |  1.8[H]    Ca    8.6      23 Jan 2025 10:40  Phos  3.6     01-23  Mg     1.9     01-23    TPro  5.9[L]  /  Alb  3.3[L]  /  TBili  0.3  /  DBili  x   /  AST  23  /  ALT  10  /  AlkPhos  85  01-23      PT/INR - ( 23 Jan 2025 10:40 )   PT: 32.50 sec;   INR: 2.70 ratio         PTT - ( 23 Jan 2025 10:40 )  PTT:36.2 sec                                       Urinalysis Basic - ( 23 Jan 2025 12:00 )    Color: Yellow / Appearance: Clear / SG: >1.030 / pH: x  Gluc: x / Ketone: Trace mg/dL  / Bili: Negative / Urobili: 1.0 mg/dL   Blood: x / Protein: 100 mg/dL / Nitrite: Negative   Leuk Esterase: Negative / RBC: 1 /HPF / WBC 3 /HPF   Sq Epi: x / Non Sq Epi: 4 /HPF / Bacteria: Negative /HPF                                                  LIVER FUNCTIONS - ( 23 Jan 2025 05:00 )  Alb: 3.3 g/dL / Pro: 5.9 g/dL / ALK PHOS: 85 U/L / ALT: 10 U/L / AST: 23 U/L / GGT: x                                                                                                MEDICATIONS  (STANDING):  acetaminophen     Tablet .. 650 milliGRAM(s) Oral every 6 hours  allopurinol 100 milliGRAM(s) Oral daily  chlorhexidine 2% Cloths 1 Application(s) Topical <User Schedule>  lactated ringers. 1000 milliLiter(s) (120 mL/Hr) IV Continuous <Continuous>  lidocaine   4% Patch 1 Patch Transdermal daily  methocarbamol 500 milliGRAM(s) Oral every 8 hours  metoprolol tartrate 12.5 milliGRAM(s) Oral every 12 hours  polyethylene glycol 3350 17 Gram(s) Oral daily  prothrombin complex concentrate human-lans IVPB (BALFAXAR) 1000 International Unit(s) IV Intermittent once  rosuvastatin 20 milliGRAM(s) Oral at bedtime  senna 2 Tablet(s) Oral at bedtime    MEDICATIONS  (PRN):       CT Chest w/ IV Cont (01.23.25 @ 05:31) >  IMPRESSION:    1. Left retroperitoneal hematoma measuring approximately 7 x 3.8 x 8.3   cm. Additional left pelvic sidewall/Iliacus intramuscular hematoma   measuring 7.1 x 4.7 x 9.6 cm with focus of contrast extravasation noted,   consistent with active bleed.    2. Right lower lobe cavitary lesion measuring up to 4.2 cm. Findings   concerning for may be infectious, inflammatory or neoplastic in etiology.   Further evaluation recommended.

## 2025-01-23 NOTE — H&P ADULT - NSHPPHYSICALEXAM_GEN_ALL_CORE
T(F): 96.3 (01-23-25 @ 06:50), Max: 97.1 (01-23-25 @ 05:39)  HR: 55 (01-23-25 @ 07:32) (55 - 99)  BP: 122/53 (01-23-25 @ 07:32) (88/46 - 140/61)  RR: 18 (01-23-25 @ 07:32) (18 - 18)  SpO2: 98% (01-23-25 @ 07:32) (94% - 98%)    CONSTITUTIONAL: Well groomed, no apparent distress  EYES: PERRLA and symmetric, EOMI, No conjunctival or scleral injection, non-icteric  ENMT: right temple abrasion, Oral mucosa with moist membranes. Normal dentition; no pharyngeal injection or exudates  NECK: Supple, symmetric and without tracheal deviation   RESP: No respiratory distress, no use of accessory muscles; CTA b/l, no WRR  CV: irregular rhythm, normal rate, +S1S2, no MRG; no JVD; no peripheral edema  GI: Soft, mild left sided tenderness, left lower flank tenderness, ND, no rebound, no guarding; no palpable masses; no hepatosplenomegaly; no hernia palpated  MSK:  No digital clubbing or cyanosis; examination of the extremities without misalignment, Normal ROM without pain, no spinal tenderness, normal muscle strength/tone   - Right hand finger abrasions  SKIN: No rashes or ulcers noted; no subcutaneous nodules or induration palpable  NEURO: CN II-XII intact; normal reflexes in upper and lower extremities, sensation intact in upper and lower extremities b/l to light touch   PSYCH: Appropriate insight/judgment; A+O x 3, mood and affect appropriate, recent/remote memory intact

## 2025-01-23 NOTE — PHYSICAL THERAPY INITIAL EVALUATION ADULT - SPECIFY REASON(S)
case discussed with SICU medical team. pt is actively bleeding. he was transfused but repeat Hgb has not yet been done. Team agrees to hold PT and follow up as appropriate.

## 2025-01-23 NOTE — CONSULT NOTE ADULT - CONSULT REASON
Hemodynamic monitoring and q6h CBC in the setting of Left retroperitoneal hematoma and left pelvic sidewall/Iliacus intramuscular hematoma with active extravasation
New Right lower lobe cavitary lesion measuring up to 4.2 cm. Findings concerning for may be infectious, inflammatory or neoplastic in etiology. Further evaluation recommended.
fall
Left RP hematoma with extrav

## 2025-01-23 NOTE — PHARMACOTHERAPY INTERVENTION NOTE - COMMENTS
As per Dr Boyd, Patient weight 62 kg calculated dose was 1550 mg but was adjusted to 1500 mg. Patient takes Xarelto 20 mg HS. Last night, patient fell and hit his head and left abdomen. Dr Boyd will record weight on patient's profile

## 2025-01-23 NOTE — CONSULT NOTE ADULT - ASSESSMENT
IMPRESSION    Right lower lobe cavitary lesion     PLAN  IMPRESSION  Right lower lobe cavitary lesion   Ex heavy smoker     PLAN   Procalcitonin  Will Treat as aspiration pneumonia  Recommend Augmentin for 7 days  Swallow evaluation  Malignancy on the differential   Repeat CT scan in 4-6 weeks, if still present consider PET CT

## 2025-01-23 NOTE — CONSULT NOTE ADULT - ASSESSMENT
INTERVENTIONAL RADIOLOGY CONSULT:     HPI:  89M with PMH of HTN, HLD, gout, CAD s/p CABGx4, afib on xarelto presents to the ED s/p mechanical fall (+HT, -LOC, +AC). Patient lives at home with family next door and ambulates with a walker at baseline. Patient got up to use the bathroom around 3am this morning when he rounded a corner and fell on top of his walker, hitting his left abdomen on the walker as he went down. Patient was unable to get up on his own. Family was nearby and was able to assist him up. Patient reported abdominal pain, presented to [facility] and received a panscan revealing retroperitoneal hemorrhage. Patient transferred to [facility] for further management. ABCs intact, GCS 15. External signs of trauma: right temple abrasion, right finger abrasions (23 Jan 2025 08:37)      PAST MEDICAL & SURGICAL HISTORY:  HTN (hypertension)      HLD (hyperlipidemia)      Gout      CAD (coronary artery disease)      Chronic atrial fibrillation      S/P CABG x 4      History of repair of left hip joint        MEDICATIONS  (STANDING):  acetaminophen     Tablet .. 650 milliGRAM(s) Oral every 6 hours  allopurinol 100 milliGRAM(s) Oral daily  chlorhexidine 2% Cloths 1 Application(s) Topical <User Schedule>  cyclobenzaprine 5 milliGRAM(s) Oral every 8 hours  lactated ringers. 1000 milliLiter(s) (120 mL/Hr) IV Continuous <Continuous>  lidocaine   4% Patch 1 Patch Transdermal daily  metoprolol tartrate 25 milliGRAM(s) Oral two times a day  polyethylene glycol 3350 17 Gram(s) Oral daily  rosuvastatin 20 milliGRAM(s) Oral at bedtime  senna 2 Tablet(s) Oral at bedtime    MEDICATIONS  (PRN):    Allergies    No Known Allergies    Intolerances        Physical Exam:   Vital Signs Last 24 Hrs  T(C): 35.7 (23 Jan 2025 06:50), Max: 36.2 (23 Jan 2025 05:39)  T(F): 96.3 (23 Jan 2025 06:50), Max: 97.1 (23 Jan 2025 05:39)  HR: 55 (23 Jan 2025 07:32) (55 - 99)  BP: 122/53 (23 Jan 2025 07:32) (88/46 - 140/61)  BP(mean): --  RR: 18 (23 Jan 2025 07:32) (18 - 18)  SpO2: 98% (23 Jan 2025 07:32) (94% - 98%)    Parameters below as of 23 Jan 2025 06:50  Patient On (Oxygen Delivery Method): room air        Labs:                         9.4    15.53 )-----------( 352      ( 23 Jan 2025 05:00 )             29.5     01-23    140  |  102  |  33[H]  ----------------------------<  203[H]  4.3   |  20  |  1.9[H]    Ca    8.5      23 Jan 2025 05:00  Mg     2.0     01-23    TPro  5.9[L]  /  Alb  3.3[L]  /  TBili  0.3  /  DBili  x   /  AST  23  /  ALT  10  /  AlkPhos  85  01-23    PT/INR - ( 23 Jan 2025 05:00 )   PT: >40.00 sec;   INR: 3.81 ratio         PTT - ( 23 Jan 2025 05:00 )  PTT:44.7 sec    ASSESSMENT/PLAN:   89M on Xarelto (last dose 1/22 PM) who fell last night. BIBA and pan-scanned found to have RLL cavitary lesion and a left retroperitoneal heamtoma with active arterial extravazation. No CT evidence of traumatic injury. -140/50-60. HR 50-80s. Hgb 9.4. Plt 352. INR 3.8. Cr 1.9   - No acute IR intervention  - Recommend reversal of Xarelto and correction of INR.   - Follow q6 CBC. Transfuse PRN.    Thank you for the courtesy of this consult. Please call Interventional Radiology [phone] (M-F, until 5pm) or [phone] (all other hours) if questions. INTERVENTIONAL RADIOLOGY CONSULT:     HPI:  89M with PMH of HTN, HLD, gout, CAD s/p CABGx4, afib on xarelto presents to the ED s/p mechanical fall (+HT, -LOC, +AC). Patient lives at home with family next door and ambulates with a walker at baseline. Patient got up to use the bathroom around 3am this morning when he rounded a corner and fell on top of his walker, hitting his left abdomen on the walker as he went down. Patient was unable to get up on his own. Family was nearby and was able to assist him up. Patient reported abdominal pain, presented to [facility] and received a panscan revealing retroperitoneal hemorrhage. Patient transferred to [facility] for further management. ABCs intact, GCS 15. External signs of trauma: right temple abrasion, right finger abrasions (23 Jan 2025 08:37)      PAST MEDICAL & SURGICAL HISTORY:  HTN (hypertension)      HLD (hyperlipidemia)      Gout      CAD (coronary artery disease)      Chronic atrial fibrillation      S/P CABG x 4      History of repair of left hip joint        MEDICATIONS  (STANDING):  acetaminophen     Tablet .. 650 milliGRAM(s) Oral every 6 hours  allopurinol 100 milliGRAM(s) Oral daily  chlorhexidine 2% Cloths 1 Application(s) Topical <User Schedule>  cyclobenzaprine 5 milliGRAM(s) Oral every 8 hours  lactated ringers. 1000 milliLiter(s) (120 mL/Hr) IV Continuous <Continuous>  lidocaine   4% Patch 1 Patch Transdermal daily  metoprolol tartrate 25 milliGRAM(s) Oral two times a day  polyethylene glycol 3350 17 Gram(s) Oral daily  rosuvastatin 20 milliGRAM(s) Oral at bedtime  senna 2 Tablet(s) Oral at bedtime    MEDICATIONS  (PRN):    Allergies    No Known Allergies    Intolerances        Physical Exam:   Vital Signs Last 24 Hrs  T(C): 35.7 (23 Jan 2025 06:50), Max: 36.2 (23 Jan 2025 05:39)  T(F): 96.3 (23 Jan 2025 06:50), Max: 97.1 (23 Jan 2025 05:39)  HR: 55 (23 Jan 2025 07:32) (55 - 99)  BP: 122/53 (23 Jan 2025 07:32) (88/46 - 140/61)  BP(mean): --  RR: 18 (23 Jan 2025 07:32) (18 - 18)  SpO2: 98% (23 Jan 2025 07:32) (94% - 98%)    Parameters below as of 23 Jan 2025 06:50  Patient On (Oxygen Delivery Method): room air        Labs:                         9.4    15.53 )-----------( 352      ( 23 Jan 2025 05:00 )             29.5     01-23    140  |  102  |  33[H]  ----------------------------<  203[H]  4.3   |  20  |  1.9[H]    Ca    8.5      23 Jan 2025 05:00  Mg     2.0     01-23    TPro  5.9[L]  /  Alb  3.3[L]  /  TBili  0.3  /  DBili  x   /  AST  23  /  ALT  10  /  AlkPhos  85  01-23    PT/INR - ( 23 Jan 2025 05:00 )   PT: >40.00 sec;   INR: 3.81 ratio         PTT - ( 23 Jan 2025 05:00 )  PTT:44.7 sec    ASSESSMENT/PLAN:   89M on Xarelto (last dose 1/22 PM) who fell last night. BIBA and pan-scanned found to have RLL cavitary lesion and a left retroperitoneal heamtoma with active arterial extravazation. No CT evidence of traumatic injury. -140/50-60. HR 50-80s. Hgb 9.4-->7.8. Plt 280. INR 3.8-->2.7. Cr 1.8  - No acute IR intervention  - Recommend reversal of Xarelto and correction of INR.   - Follow q6 CBC. Transfuse PRN.  - The risk of intervention with Xarelto on board and INR >1.5 currently outweighs the benefit given no hemodynamic instability. Xarelto can be reversed with Andexxa, otherwise patient needs 6 doses off (CrCl <50) for Xarelto to clear.   - If patient becomes hemodynamically unstable please call IR Spectra [phone].   - Discussed with SICU team Spectra [phone]      Thank you for the courtesy of this consult. Please call Interventional Radiology [phone] (M-F, until 5pm) or [phone] (all other hours) if questions.

## 2025-01-23 NOTE — PATIENT PROFILE ADULT - TOBACCO USE
Detail Level: Detailed Unknown if ever smoked Depth Of Biopsy: dermis Was A Bandage Applied: Yes Size Of Lesion In Cm: 0 Biopsy Type: H and E Biopsy Method: Dermablade Anesthesia Type: 1% lidocaine with epinephrine Anesthesia Volume In Cc: 0.5 Hemostasis: Aluminum Chloride Wound Care: Petrolatum Dressing: bandage Destruction After The Procedure: No Type Of Destruction Used: Curettage Curettage Text: The wound bed was treated with curettage after the biopsy was performed. Cryotherapy Text: The wound bed was treated with cryotherapy after the biopsy was performed. Electrodesiccation Text: The wound bed was treated with electrodesiccation after the biopsy was performed. Electrodesiccation And Curettage Text: The wound bed was treated with electrodesiccation and curettage after the biopsy was performed. Silver Nitrate Text: The wound bed was treated with silver nitrate after the biopsy was performed. Lab: 6 Consent: Written consent was obtained and risks were reviewed including but not limited to scarring, infection, bleeding, scabbing, incomplete removal, nerve damage and allergy to anesthesia. Post-Care Instructions: I reviewed with the patient in detail post-care instructions. Patient is to keep the biopsy site dry overnight, and then apply bacitracin twice daily until healed. Patient may apply hydrogen peroxide soaks to remove any crusting. Notification Instructions: Patient will be notified of biopsy results. However, patient instructed to call the office if not contacted within 2 weeks. Billing Type: Third-Party Bill Information: Selecting Yes will display possible errors in your note based on the variables you have selected. This validation is only offered as a suggestion for you. PLEASE NOTE THAT THE VALIDATION TEXT WILL BE REMOVED WHEN YOU FINALIZE YOUR NOTE. IF YOU WANT TO FAX A PRELIMINARY NOTE YOU WILL NEED TO TOGGLE THIS TO 'NO' IF YOU DO NOT WANT IT IN YOUR FAXED NOTE. Former smoker

## 2025-01-23 NOTE — ED PROVIDER NOTE - OBJECTIVE STATEMENT
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 patient was trying to get up to go to bathroom and fell and hit head. Pt denies loss of consciousness. family says he wasn't acting like himself at first but is now doing better.

## 2025-01-23 NOTE — ED PROVIDER NOTE - ATTENDING APP SHARED VISIT CONTRIBUTION OF CARE
89-year-old male above past medical history on Xarelto brought in by EMS for evaluation, got up to go to the bathroom and fell, denies LOC but is poor historian, family heard the alarm intended the patient who is on the floor for minutes only, noted black eye, patient's also been having left lateral hip pain prior to the fall, denies chest pain or shortness of breath, states he still needs to urinate, on exam vitals appreciated, nontoxic-appearing alert and oriented x 2 (knows month but not day), head normocephalic with seborrhea with irritation, has right supraorbital laceration and infraorbital ecchymosis with no orbital rim step-off or tenderness, PERRLA, EOMI, OP clear, neck supple no CTLs midline TTP, cor irregular, lungs clear, abdomen soft with mild suprapubic distention no guarding or rebound, pelvis stable, full range of motion x 4 with mild left lateral hip pain without tenderness on internal rotation, neurovascularly intact, given blood pressure will broaden workup to include labs and pan scan

## 2025-01-23 NOTE — H&P ADULT - ATTENDING COMMENTS
This is 89M with PMH of HTN, HLD, gout, CAD s/p CABGx4, afib on xarelto presents to the ED s/p mechanical fall (+HT, -LOC, +AC). Patient lives at home with family next door and ambulates with a walker at baseline. Patient got up to use the bathroom around 3am this morning when he rounded a corner and fell on top of his walker, hitting his left abdomen on the walker as he went down. Patient was unable to get up on his own. Family was nearby and was able to assist him up. Patient reported abdominal pain, presented to [facility] and received a panscan revealing retroperitoneal hemorrhage. Patient transferred to [facility] for further management.    Primary and secondary surveys were performed.    AAO x3  HGCS 15.  Neuro intact  Neck: no step-offs  Chest: clear.  CV : RRR  Abdomen: soft, mildly tender in LLQ, no rebound  Extr: no deformities    I independently reviewed all images and labs:  CT Head - no ICH  CT C-spine - no fractures  CT Chest/Abdomen/Pelvis - left retroperitoneal hematoma; left pelvic hematoma with blush  Creat 1.9  INR 3.8  Hb 9.4    ASSESSMENT:  89 y/o male, S/P Fall.  Closed head injury.  Traumatic Left Retroperitoneal Hematoma.  Traumatic Left Pelvic Hematoma with blush.  Acute pain due to trauma.  Acute blood loss anemia.  Coagulopathy due to Xarelto.  ALIYAH.  At risk for hemodynamic instability.  A.fib.      PLAN:  - give KCentra in ED  - needs continuous Sao2 and hemodynamic monitoring  - IR eval for possible angioembolization  - serial H&H - repeat 7.8  - hold xarelto  - keep MAP>65; transfuse 1 unit PRBC  - NPO  - follow serum electrolytes and UOP  - ID - universal precautions  - DVT prophylaxis with SCDs only for now  Admit to SICU.  -

## 2025-01-23 NOTE — PATIENT PROFILE ADULT - FALL HARM RISK - HARM RISK INTERVENTIONS

## 2025-01-23 NOTE — PATIENT PROFILE ADULT - FUNCTIONAL ASSESSMENT - BASIC MOBILITY 6.
3-calculated by average /Not able to assess (calculate score using [facility] averaging method)

## 2025-01-24 ENCOUNTER — TRANSCRIPTION ENCOUNTER (OUTPATIENT)
Age: 89
End: 2025-01-24

## 2025-01-24 VITALS
HEART RATE: 84 BPM | TEMPERATURE: 98 F | OXYGEN SATURATION: 98 % | RESPIRATION RATE: 24 BRPM | DIASTOLIC BLOOD PRESSURE: 63 MMHG | SYSTOLIC BLOOD PRESSURE: 155 MMHG

## 2025-01-24 LAB
ANION GAP SERPL CALC-SCNC: 12 MMOL/L — SIGNIFICANT CHANGE UP (ref 7–14)
ANION GAP SERPL CALC-SCNC: 13 MMOL/L — SIGNIFICANT CHANGE UP (ref 7–14)
APTT BLD: 30.2 SEC — SIGNIFICANT CHANGE UP (ref 27–39.2)
APTT BLD: 35.1 SEC — SIGNIFICANT CHANGE UP (ref 27–39.2)
APTT BLD: 36.5 SEC — SIGNIFICANT CHANGE UP (ref 27–39.2)
BASOPHILS # BLD AUTO: 0.08 K/UL — SIGNIFICANT CHANGE UP (ref 0–0.2)
BASOPHILS NFR BLD AUTO: 0.7 % — SIGNIFICANT CHANGE UP (ref 0–1)
BUN SERPL-MCNC: 33 MG/DL — HIGH (ref 10–20)
BUN SERPL-MCNC: 34 MG/DL — HIGH (ref 10–20)
CALCIUM SERPL-MCNC: 7.7 MG/DL — LOW (ref 8.4–10.5)
CALCIUM SERPL-MCNC: 8.3 MG/DL — LOW (ref 8.4–10.4)
CHLORIDE SERPL-SCNC: 103 MMOL/L — SIGNIFICANT CHANGE UP (ref 98–110)
CHLORIDE SERPL-SCNC: 108 MMOL/L — SIGNIFICANT CHANGE UP (ref 98–110)
CO2 SERPL-SCNC: 21 MMOL/L — SIGNIFICANT CHANGE UP (ref 17–32)
CO2 SERPL-SCNC: 21 MMOL/L — SIGNIFICANT CHANGE UP (ref 17–32)
CREAT SERPL-MCNC: 1.6 MG/DL — HIGH (ref 0.7–1.5)
CREAT SERPL-MCNC: 2 MG/DL — HIGH (ref 0.7–1.5)
EGFR: 31 ML/MIN/1.73M2 — LOW
EGFR: 40 ML/MIN/1.73M2 — LOW
EOSINOPHIL # BLD AUTO: 0.59 K/UL — SIGNIFICANT CHANGE UP (ref 0–0.7)
EOSINOPHIL NFR BLD AUTO: 5.3 % — SIGNIFICANT CHANGE UP (ref 0–8)
GLUCOSE BLDC GLUCOMTR-MCNC: 121 MG/DL — HIGH (ref 70–99)
GLUCOSE SERPL-MCNC: 137 MG/DL — HIGH (ref 70–99)
GLUCOSE SERPL-MCNC: 93 MG/DL — SIGNIFICANT CHANGE UP (ref 70–99)
HCT VFR BLD CALC: 22.8 % — LOW (ref 42–52)
HCT VFR BLD CALC: 26 % — LOW (ref 42–52)
HGB BLD-MCNC: 7.4 G/DL — LOW (ref 14–18)
HGB BLD-MCNC: 8.4 G/DL — LOW (ref 14–18)
IMM GRANULOCYTES NFR BLD AUTO: 0.4 % — HIGH (ref 0.1–0.3)
INR BLD: 1.03 RATIO — SIGNIFICANT CHANGE UP (ref 0.65–1.3)
INR BLD: 1.68 RATIO — HIGH (ref 0.65–1.3)
INR BLD: 1.95 RATIO — HIGH (ref 0.65–1.3)
LYMPHOCYTES # BLD AUTO: 2.39 K/UL — SIGNIFICANT CHANGE UP (ref 1.2–3.4)
LYMPHOCYTES # BLD AUTO: 21.5 % — SIGNIFICANT CHANGE UP (ref 20.5–51.1)
MAGNESIUM SERPL-MCNC: 2 MG/DL — SIGNIFICANT CHANGE UP (ref 1.8–2.4)
MAGNESIUM SERPL-MCNC: 2.4 MG/DL — SIGNIFICANT CHANGE UP (ref 1.8–2.4)
MCHC RBC-ENTMCNC: 28.7 PG — SIGNIFICANT CHANGE UP (ref 27–31)
MCHC RBC-ENTMCNC: 28.8 PG — SIGNIFICANT CHANGE UP (ref 27–31)
MCHC RBC-ENTMCNC: 32.3 G/DL — SIGNIFICANT CHANGE UP (ref 32–37)
MCHC RBC-ENTMCNC: 32.5 G/DL — SIGNIFICANT CHANGE UP (ref 32–37)
MCV RBC AUTO: 88.4 FL — SIGNIFICANT CHANGE UP (ref 80–94)
MCV RBC AUTO: 89 FL — SIGNIFICANT CHANGE UP (ref 80–94)
MONOCYTES # BLD AUTO: 0.65 K/UL — HIGH (ref 0.1–0.6)
MONOCYTES NFR BLD AUTO: 5.8 % — SIGNIFICANT CHANGE UP (ref 1.7–9.3)
NEUTROPHILS # BLD AUTO: 7.38 K/UL — HIGH (ref 1.4–6.5)
NEUTROPHILS NFR BLD AUTO: 66.3 % — SIGNIFICANT CHANGE UP (ref 42.2–75.2)
NRBC # BLD: 0 /100 WBCS — SIGNIFICANT CHANGE UP (ref 0–0)
NRBC # BLD: 0 /100 WBCS — SIGNIFICANT CHANGE UP (ref 0–0)
NRBC BLD-RTO: 0 /100 WBCS — SIGNIFICANT CHANGE UP (ref 0–0)
NRBC BLD-RTO: 0 /100 WBCS — SIGNIFICANT CHANGE UP (ref 0–0)
PHOSPHATE SERPL-MCNC: 3.6 MG/DL — SIGNIFICANT CHANGE UP (ref 2.1–4.9)
PHOSPHATE SERPL-MCNC: 3.9 MG/DL — SIGNIFICANT CHANGE UP (ref 2.1–4.9)
PLATELET # BLD AUTO: 253 K/UL — SIGNIFICANT CHANGE UP (ref 130–400)
PLATELET # BLD AUTO: 254 K/UL — SIGNIFICANT CHANGE UP (ref 130–400)
PMV BLD: 10.2 FL — SIGNIFICANT CHANGE UP (ref 7.4–10.4)
PMV BLD: 9.9 FL — SIGNIFICANT CHANGE UP (ref 7.4–10.4)
POTASSIUM SERPL-MCNC: 4 MMOL/L — SIGNIFICANT CHANGE UP (ref 3.5–5)
POTASSIUM SERPL-MCNC: 4.2 MMOL/L — SIGNIFICANT CHANGE UP (ref 3.5–5)
POTASSIUM SERPL-SCNC: 4 MMOL/L — SIGNIFICANT CHANGE UP (ref 3.5–5)
POTASSIUM SERPL-SCNC: 4.2 MMOL/L — SIGNIFICANT CHANGE UP (ref 3.5–5)
PROTHROM AB SERPL-ACNC: 12.2 SEC — SIGNIFICANT CHANGE UP (ref 9.95–12.87)
PROTHROM AB SERPL-ACNC: 20 SEC — HIGH (ref 9.95–12.87)
PROTHROM AB SERPL-ACNC: 23.3 SEC — HIGH (ref 9.95–12.87)
RBC # BLD: 2.58 M/UL — LOW (ref 4.7–6.1)
RBC # BLD: 2.92 M/UL — LOW (ref 4.7–6.1)
RBC # FLD: 14.5 % — SIGNIFICANT CHANGE UP (ref 11.5–14.5)
RBC # FLD: 14.6 % — HIGH (ref 11.5–14.5)
SODIUM SERPL-SCNC: 137 MMOL/L — SIGNIFICANT CHANGE UP (ref 135–146)
SODIUM SERPL-SCNC: 141 MMOL/L — SIGNIFICANT CHANGE UP (ref 135–146)
WBC # BLD: 11.13 K/UL — HIGH (ref 4.8–10.8)
WBC # BLD: 9.52 K/UL — SIGNIFICANT CHANGE UP (ref 4.8–10.8)
WBC # FLD AUTO: 11.13 K/UL — HIGH (ref 4.8–10.8)
WBC # FLD AUTO: 9.52 K/UL — SIGNIFICANT CHANGE UP (ref 4.8–10.8)

## 2025-01-24 PROCEDURE — 99238 HOSP IP/OBS DSCHRG MGMT 30/<: CPT

## 2025-01-24 PROCEDURE — 74174 CTA ABD&PLVS W/CONTRAST: CPT | Mod: 26

## 2025-01-24 PROCEDURE — ZZZZZ: CPT

## 2025-01-24 RX ORDER — AMOXICILLIN AND CLAVULANATE POTASSIUM 200; 28.5 MG/5ML; MG/5ML
1 POWDER, FOR SUSPENSION ORAL
Qty: 14 | Refills: 0
Start: 2025-01-24 | End: 2025-01-30

## 2025-01-24 RX ORDER — RIVAROXABAN 20 MG/1
1 TABLET, FILM COATED ORAL
Refills: 0 | DISCHARGE

## 2025-01-24 RX ORDER — SENNOSIDES 8.6 MG
2 TABLET ORAL
Qty: 0 | Refills: 0 | DISCHARGE
Start: 2025-01-24

## 2025-01-24 RX ORDER — AMOXICILLIN AND CLAVULANATE POTASSIUM 200; 28.5 MG/5ML; MG/5ML
1 POWDER, FOR SUSPENSION ORAL EVERY 12 HOURS
Refills: 0 | Status: DISCONTINUED | OUTPATIENT
Start: 2025-01-24 | End: 2025-01-24

## 2025-01-24 RX ORDER — ACETAMINOPHEN 160 MG/5ML
1000 SUSPENSION ORAL ONCE
Refills: 0 | Status: DISCONTINUED | OUTPATIENT
Start: 2025-01-23 | End: 2025-01-24

## 2025-01-24 RX ORDER — POLYETHYLENE GLYCOL 3350 17 G/17G
17 POWDER, FOR SOLUTION ORAL
Qty: 0 | Refills: 0 | DISCHARGE
Start: 2025-01-24

## 2025-01-24 RX ORDER — HEPARIN SODIUM,PORCINE 10000/ML
5000 VIAL (ML) INJECTION EVERY 8 HOURS
Refills: 0 | Status: DISCONTINUED | OUTPATIENT
Start: 2025-01-24 | End: 2025-01-24

## 2025-01-24 RX ORDER — ACETAMINOPHEN 160 MG/5ML
2 SUSPENSION ORAL
Qty: 0 | Refills: 0 | DISCHARGE
Start: 2025-01-24

## 2025-01-24 RX ADMIN — ACETAMINOPHEN 650 MILLIGRAM(S): 160 SUSPENSION ORAL at 06:00

## 2025-01-24 RX ADMIN — LIDOCAINE HYDROCHLORIDE 1 PATCH: 30 CREAM TOPICAL at 11:58

## 2025-01-24 RX ADMIN — Medication 12.5 MILLIGRAM(S): at 17:47

## 2025-01-24 RX ADMIN — Medication 500 MILLIGRAM(S): at 05:31

## 2025-01-24 RX ADMIN — Medication 500 MILLIGRAM(S): at 14:46

## 2025-01-24 RX ADMIN — Medication 5000 UNIT(S): at 14:01

## 2025-01-24 RX ADMIN — AMOXICILLIN AND CLAVULANATE POTASSIUM 1 TABLET(S): 200; 28.5 POWDER, FOR SUSPENSION ORAL at 17:47

## 2025-01-24 RX ADMIN — ACETAMINOPHEN 650 MILLIGRAM(S): 160 SUSPENSION ORAL at 12:16

## 2025-01-24 RX ADMIN — ACETAMINOPHEN 650 MILLIGRAM(S): 160 SUSPENSION ORAL at 17:46

## 2025-01-24 RX ADMIN — Medication 100 MILLIGRAM(S): at 11:57

## 2025-01-24 RX ADMIN — ACETAMINOPHEN 650 MILLIGRAM(S): 160 SUSPENSION ORAL at 05:31

## 2025-01-24 RX ADMIN — ACETAMINOPHEN 650 MILLIGRAM(S): 160 SUSPENSION ORAL at 11:58

## 2025-01-24 RX ADMIN — Medication 12.5 MILLIGRAM(S): at 05:31

## 2025-01-24 RX ADMIN — LIDOCAINE HYDROCHLORIDE 1 PATCH: 30 CREAM TOPICAL at 00:00

## 2025-01-24 RX ADMIN — ANTISEPTIC SURGICAL SCRUB 1 APPLICATION(S): 0.04 SOLUTION TOPICAL at 05:31

## 2025-01-24 NOTE — DISCHARGE NOTE PROVIDER - NSDCCPCAREPLAN_GEN_ALL_CORE_FT
PRINCIPAL DISCHARGE DIAGNOSIS  Diagnosis: Fall  Assessment and Plan of Treatment: Diet: Continue regular diet  Activity: Ambulate and get out of bed as tolerated, and with assistance if feeling weak.  Pain: You can take over the counter medications such as Tylenol for pain control. Please adhere to the instructions on the back of the bottle.   Antibiotics: Augmentin 875 BID x 7 days as recommended by pulmonology.   HOME MEDS: Please DO NOT take your xarelto (blood thinner) for 2 weeks. Can restart on 2/6/25. Follow up with your primary care provider for further management.   Follow up: Please follow up in trauma clinic in 1-2 weeks. Please call [phone] to make an appointment.  - Please follow up with pulmonology for outpatient CT in 4-6 weeks.  If you develop fevers, chills, worsening pain, nausea that won't subside, vomiting, or any other symptoms of concern, please call MD or return to the ED for further advice, evaluation, and/or treatment.      SECONDARY DISCHARGE DIAGNOSES  Diagnosis: Retroperitoneal hematoma  Assessment and Plan of Treatment:

## 2025-01-24 NOTE — CHART NOTE - NSCHARTNOTEFT_GEN_A_CORE
Patient was seen and examined by writer prior to transfer from SICU to next level of care. Patient with no acute changes in clinical status since downgrade determination by SICU team and remains optimized for disposition.  T(F): 98 (01-24-25 @ 16:00)  HR: 72 (01-24-25 @ 17:00)  BP: 135/62 (01-24-25 @ 17:00)  BP(mean): 89 (01-24-25 @ 17:00)  RR: 21 (01-24-25 @ 17:00)  SpO2: 99% (01-24-25 @ 17:00)      Patient with Creatinine increase to 2.0 from 1.6. Per Dr. Raygoza ok to discharge, no further intervention, likely secondary to contrast from CTA. Primary team aware.  No hemodynamic changes.

## 2025-01-24 NOTE — DISCHARGE NOTE NURSING/CASE MANAGEMENT/SOCIAL WORK - NSDCPEFALRISK_GEN_ALL_CORE
For information on Fall & Injury Prevention, visit: [email or web address] OR  [email or web address] OR  https://www.cdc.gov/steadi/patient.html

## 2025-01-24 NOTE — PHYSICAL THERAPY INITIAL EVALUATION ADULT - GAIT TRAINING, PT EVAL
Pt will ambulate using RW or least restrictive AD for 200 ft with supervision by discharge to facilitate return to PLOF. Pt will negotiate 4 steps using 1 HR under supervision.

## 2025-01-24 NOTE — DISCHARGE NOTE PROVIDER - CARE PROVIDER_API CALL
Radha Juarez  Pulmonary Disease  [address] [place] [id number]  Phone: [phone]  Fax: [phone]  Follow Up Time: 1 month

## 2025-01-24 NOTE — PROGRESS NOTE ADULT - SUBJECTIVE AND OBJECTIVE BOX
LYLA RICHARD   [id number]   [birth year]  89yM    Admit Date: 01-23-25  Indication for SDU/SICU: Hemodynamic monitoring and q6h CBC in the setting of Left retroperitoneal hematoma and left pelvic sidewall/Iliacus intramuscular hematoma with active extravasation  ============================  SICU course:   1/23: admitted to SICU, 1u PRBC, total of 2500U PCC      24 Hour Events  1/23  NIGHT   -PM rounds: , AFIB HR 60s, saturating 98% on RA, DP pulses palpable, L flank soft, non-tender, complaining of no pain   -Post PRBC hgb 7.9 from 7.8 > pending repeat around midnight, remains normotensive, not tachycardic   -F/u UOP: 10-15cc/hr in setting of ALIYAH (Cr 1.8, baseline 1.2)   -AM labs to treng hgb/INR   -F/u pulmonology for cavitary lesion       DAY  -Admission under SICU service  - 1u PRBC pending type and screen  - Xray L elbow, forearm, and hand  - place cadet  - urine lytes - 0.2% prerenal, 500cc LR ordered  - UA negative   - f/u echo read  - keep NPO  - recall IR - no intervention unless gets andexxa, waits the time of 6 doses of xarelto or becomed hemodynamically unstable.  - 1g mag  - additional 1000U k centra  -Lactate 1.8   LUE x ray: No acute fracture or dislocation. Mild degenerative changes of the elbow joint. There is elbow joint effusion. In the setting of trauma, an occult fracture is suspected. Recommend CT of the left elbow noncontrast exam for further evaluation.  - echo EF 55 to 60%, Mildly increased LV wall thickness, Mildly enlarged left atrium, mild MR, mild PA      [X] A ten-point review of systems was otherwise negative except as noted above.  [  ] Due to altered mental status/intubation, subjective information was not attained from the patient. History was obtained, to the extent possible, from review of the chart and collateral sources of information.  ================================================== LYLA RICHARD   [id number]   [birth year]  89yM    Admit Date: 01-23-25  Indication for SDU/SICU: Hemodynamic monitoring and q6h CBC in the setting of Left retroperitoneal hematoma and left pelvic sidewall/Iliacus intramuscular hematoma with active extravasation  ============================  SICU course:   1/23: admitted to SICU, 1u PRBC, total of 2500U PCC      24 Hour Events  1/23  NIGHT   -PM rounds: , AFIB HR 60s, saturating 98% on RA, DP pulses palpable, L flank soft, non-tender, complaining of no pain   -Post PRBC hgb 7.9 from 7.8 > pending repeat around midnight, remains normotensive, not tachycardic   -F/u UOP: 10-15cc/hr in setting of ALIYAH (Cr 1.8, baseline 1.2)   -AM labs to treng hgb/INR   -F/u pulmonology for cavitary lesion       DAY  -Admission under SICU service  - 1u PRBC pending type and screen  - Xray L elbow, forearm, and hand  - place cadet  - urine lytes - 0.2% prerenal, 500cc LR ordered  - UA negative   - f/u echo read  - keep NPO  - recall IR - no intervention unless gets andexxa, waits the time of 6 doses of xarelto or becomed hemodynamically unstable.  - 1g mag  - additional 1000U k centra  -Lactate 1.8   LUE x ray: No acute fracture or dislocation. Mild degenerative changes of the elbow joint. There is elbow joint effusion. In the setting of trauma, an occult fracture is suspected. Recommend CT of the left elbow noncontrast exam for further evaluation.  - echo EF 55 to 60%, Mildly increased LV wall thickness, Mildly enlarged left atrium, mild MR, mild MD      [X] A ten-point review of systems was otherwise negative except as noted above.  [  ] Due to altered mental status/intubation, subjective information was not attained from the patient. History was obtained, to the extent possible, from review of the chart and collateral sources of information.  ==================================================  Daily     Daily     Diet, DASH/TLC:   Sodium & Cholesterol Restricted (01-24-25 @ 09:23)      CURRENT MEDS:  Neurologic Medications  acetaminophen     Tablet .. 650 milliGRAM(s) Oral every 6 hours  methocarbamol 500 milliGRAM(s) Oral every 8 hours    Respiratory Medications    Cardiovascular Medications  metoprolol tartrate 12.5 milliGRAM(s) Oral every 12 hours    Gastrointestinal Medications  polyethylene glycol 3350 17 Gram(s) Oral daily  senna 2 Tablet(s) Oral at bedtime    Genitourinary Medications    Hematologic/Oncologic Medications  heparin   Injectable 5000 Unit(s) SubCutaneous every 8 hours    Antimicrobial/Immunologic Medications  amoxicillin  875 milliGRAM(s)/clavulanate 1 Tablet(s) Oral every 12 hours    Endocrine/Metabolic Medications  allopurinol 100 milliGRAM(s) Oral daily  rosuvastatin 20 milliGRAM(s) Oral at bedtime    Topical/Other Medications  chlorhexidine 2% Cloths 1 Application(s) Topical <User Schedule>  lidocaine   4% Patch 1 Patch Transdermal daily      ICU Vital Signs Last 24 Hrs  T(C): 36.8 (24 Jan 2025 08:00), Max: 37 (24 Jan 2025 00:00)  T(F): 98.3 (24 Jan 2025 08:00), Max: 98.6 (24 Jan 2025 00:00)  HR: 65 (24 Jan 2025 08:00) (65 - 82)  BP: 142/63 (24 Jan 2025 08:00) (104/53 - 149/65)  BP(mean): 90 (24 Jan 2025 08:00) (75 - 94)  ABP: --  ABP(mean): --  RR: 17 (24 Jan 2025 08:00) (16 - 33)  SpO2: 97% (24 Jan 2025 08:00) (95% - 100%)    O2 Parameters below as of 24 Jan 2025 08:00  Patient On (Oxygen Delivery Method): room air                    I&O's Summary    23 Jan 2025 07:01  -  24 Jan 2025 07:00  --------------------------------------------------------  IN: 3827 mL / OUT: 568 mL / NET: 3259 mL    24 Jan 2025 07:01  -  24 Jan 2025 11:01  --------------------------------------------------------  IN: 360 mL / OUT: 100 mL / NET: 260 mL      I&O's Detail    23 Jan 2025 07:01  -  24 Jan 2025 07:00  --------------------------------------------------------  IN:    IV PiggyBack: 100 mL    IV PiggyBack: 40 mL    Lactated Ringers: 2520 mL    Lactated Ringers Bolus: 500 mL    PRBCs (Packed Red Blood Cells): 667 mL  Total IN: 3827 mL    OUT:    Indwelling Catheter - Urethral (mL): 568 mL  Total OUT: 568 mL    Total NET: 3259 mL      24 Jan 2025 07:01  -  24 Jan 2025 11:01  --------------------------------------------------------  IN:    Lactated Ringers: 360 mL  Total IN: 360 mL    OUT:    Indwelling Catheter - Urethral (mL): 100 mL  Total OUT: 100 mL    Total NET: 260 mL          PHYSICAL EXAM:    General/Neuro: Alert and oriented to person and time but not place or situation. No focal deficits.   Lungs: Saturating well on RA. 1L IS. Lungs clear to auscultation, Normal expansion/effort.   Cardiovascular : S1, S2.  Regular rate and rhythm.   GI: Abdomen soft, Non-tender, Non-distended.    Extremities: Extremities warm, pink, well-perfused. Palpable DP bilaterally.   Derm: Good skin turgor, no skin breakdown.    :       Cadet catheter in place.      CXR:     LABS:  CAPILLARY BLOOD GLUCOSE      POCT Blood Glucose.: 121 mg/dL (24 Jan 2025 05:29)  POCT Blood Glucose.: 112 mg/dL (23 Jan 2025 23:03)  POCT Blood Glucose.: 112 mg/dL (23 Jan 2025 17:38)  POCT Blood Glucose.: 154 mg/dL (23 Jan 2025 12:20)                          8.4    11.13 )-----------( 254      ( 24 Jan 2025 08:25 )             26.0       01-24    141  |  108  |  33[H]  ----------------------------<  93  4.2   |  21  |  1.6[H]    Ca    7.7[L]      24 Jan 2025 01:10  Phos  3.9     01-24  Mg     2.0     01-24    TPro  5.0[L]  /  Alb  2.8[L]  /  TBili  0.5  /  DBili  0.2  /  AST  22  /  ALT  9   /  AlkPhos  69  01-23      PT/INR - ( 24 Jan 2025 07:35 )   PT: TNP sec;   INR: TNP ratio         PTT - ( 24 Jan 2025 07:35 )  PTT:TNP sec      Urinalysis Basic - ( 24 Jan 2025 01:10 )    Color: x / Appearance: x / SG: x / pH: x  Gluc: 93 mg/dL / Ketone: x  / Bili: x / Urobili: x   Blood: x / Protein: x / Nitrite: x   Leuk Esterase: x / RBC: x / WBC x   Sq Epi: x / Non Sq Epi: x / Bacteria: x

## 2025-01-24 NOTE — DISCHARGE NOTE PROVIDER - NSDCMRMEDTOKEN_GEN_ALL_CORE_FT
allopurinol 100 mg oral tablet: 1 tab(s) orally once a day  furosemide 20 mg oral tablet: 1 tab(s) orally once a day  Metoprolol Succinate ER 50 mg oral tablet, extended release: 1 tab(s) orally once a day  rosuvastatin 20 mg oral tablet: 1 tab(s) orally once a day (at bedtime)  Xarelto 20 mg oral tablet: 1 tab(s) orally once a day (at bedtime)   acetaminophen 325 mg oral tablet: 2 tab(s) orally every 6 hours  allopurinol 100 mg oral tablet: 1 tab(s) orally once a day  amoxicillin-clavulanate 875 mg-125 mg oral tablet: 1 tab(s) orally every 12 hours  furosemide 20 mg oral tablet: 1 tab(s) orally once a day  Metoprolol Succinate ER 50 mg oral tablet, extended release: 1 tab(s) orally once a day  polyethylene glycol 3350 oral powder for reconstitution: 17 gram(s) orally once a day as needed for  constipation  rosuvastatin 20 mg oral tablet: 1 tab(s) orally once a day (at bedtime)  senna leaf extract oral tablet: 2 tab(s) orally once a day (at bedtime) as needed for  constipation

## 2025-01-24 NOTE — PROGRESS NOTE ADULT - ASSESSMENT
Assessment and Plan  89M with PMH of HTN, HLD, gout, CAD s/p CABGx4, afib on xarelto, lymphoma, prostate cancer s/p prostatectomy who presented s/p mechanical fall (+HT, -LOC, +AC) with left retroperitoneal hematoma and left pelvic sidewall/Iliacus intramuscular hematoma with active extravasation    NEUROLOGICAL:  #Acute pain    -APAP 650mg q6h    -Robaxin 500mg q8h    -Lidocaine patch     RESPIRATORY:   #Oxygenation    -saturating well on RA    -Encourage incentive spirometry - pulling 1.75L   #Activity    -increase as tolerated  #Right lower lobe cavitary lesion measuring up to 4.2 cm. Findings concerning for may be infectious, inflammatory or neoplastic in etiology.    -f/u pulm consult     CARDS:   #Atrial fibrillation (dx within the last year, follows with Dr. Guillaume)    -Holding home xarelto 20mg QD    -Metoprolol 12.5 BID (takes 50mg ER at home)  #HLD    -continue rosuvastatin 20mg   #HTN    -Metoprolol 12.5 BID (takes 50mg ER at home)    -Holding home lasix 20mg   #CAD with CABG x3  echo 1/23/25: EF 55 to 60%, Mildly increased LV wall thickness, Mildly enlarged left atrium, mild MR, mild [place]    GASTROINTESTINAL/NUTRITION:   #Left retroperitoneal hematoma measuring approximately 7 x 3.8 x 8.3 cm  #left pelvic sidewall/Iliacus intramuscular hematoma measuring 7.1 x 4.7 x 9.6 cm with focus of contrast extravasation noted, consistent with active bleed    -s/p PCC in ED     -1U PRBC 1/23    -IR consult - no acute intervention   #Diet, NPO; Except Medications    -aspiration precautions, HOB 30  #GI Prophylaxis    -not indicated  #Bowel regimen    -senna/miralax     -last bowel movement      /RENAL:   #urine output in critically ill    -indwelling cadet (placed 1/23)  #ALIYAH (baseline creatinine 1.2)    -urine lytes prerenal - 500cc LR ordered 1/23  #Gout    -continue home allopurinol 100mg QD  #prostate cancer s/p partial prostatectomy     Labs:          BUN/Cr -  33/1.8  -->,  33/1.8  -->          [01-23 @ 20:22]Na  139 // K  4.1 // Mg  2.1 // Phos  4.2          [01-23 @ 10:40]Na  142 // K  3.9 // Mg  1.9 // Phos  3.6    HEME/ONC:   #DVT prophylaxis     -Chemical: holding in the setting of active bleeding      -Mechanical: SCDs  #Left retroperitoneal hematoma measuring approximately 7 x 3.8 x 8.3 cm  #left pelvic sidewall/Iliacus intramuscular hematoma measuring 7.1 x 4.7 x 9.6 cm with focus of contrast extravasation noted, consistent with active bleed    -IR consult - no acute intervention     -s/p PCC (total of 2500U)    -1U PRBC 1/23   #Afib on xarelto    -holding home Xarelto 20 mg oral tablet: 1 tab(s) orally once a day (at bedtime)  #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 if above 180 start insulin sliding scale    MSK:  #Right hand and wrist abrasion / ecchymosis     -RUE x ray: No acute fracture or dislocation. Mild degenerative changes of the elbow joint. There is elbow joint effusion. In the setting of trauma, an occult fracture is suspected. Recommend CT of the left elbow noncontrast exam for further evaluation.    Activity - Bedrest:     Additional Instructions:  supine (01-23-25 @ 08:33)    LINES/DRAINS:  Jeannette VANESSA    ADVANCED DIRECTIVES:  Full Code    HCP/Emergency Contact- Dr. Servin - [phone]    INDICATION FOR SICU/SDU: Hemodynamic monitoring and q6h CBC in the setting of Left retroperitoneal hematoma and left pelvic sidewall/Iliacus intramuscular hematoma with active extravasation    DISPO: SICU  Case discussed with attending Dr. Raygoza   LYLA RICHARD   [id number]   [birth year]  89yM    Admit Date: 01-23-25  Indication for SDU/SICU: Hemodynamic monitoring and q6h CBC in the setting of Left retroperitoneal hematoma and left pelvic sidewall/Iliacus intramuscular hematoma with active extravasation  ============================  SICU course:   1/23: admitted to SICU, 1u PRBC, total of 2500U PCC      24 Hour Events  1/23  NIGHT   -PM rounds: , AFIB HR 60s, saturating 98% on RA, DP pulses palpable, L flank soft, non-tender, complaining of no pain   -Post PRBC hgb 7.9 from 7.8 > pending repeat around midnight, remains normotensive, not tachycardic   -F/u UOP: 10-15cc/hr in setting of ALIYAH (Cr 1.8, baseline 1.2)   -AM labs to treng hgb/INR   -F/u pulmonology for cavitary lesion       DAY  -Admission under SICU service  - 1u PRBC pending type and screen  - Xray L elbow, forearm, and hand  - place cadet  - urine lytes - 0.2% prerenal, 500cc LR ordered  - UA negative   - f/u echo read  - keep NPO  - recall IR - no intervention unless gets andexxa, waits the time of 6 doses of xarelto or becomed hemodynamically unstable.  - 1g mag  - additional 1000U k centra  -Lactate 1.8   LUE x ray: No acute fracture or dislocation. Mild degenerative changes of the elbow joint. There is elbow joint effusion. In the setting of trauma, an occult fracture is suspected. Recommend CT of the left elbow noncontrast exam for further evaluation.  - echo EF 55 to 60%, Mildly increased LV wall thickness, Mildly enlarged left atrium, mild MR, mild [place]      [X] A ten-point review of systems was otherwise negative except as noted above.  [  ] Due to altered mental status/intubation, subjective information was not attained from the patient. History was obtained, to the extent possible, from review of the chart and collateral sources of information.  ==================================================      Assessment and Plan:   · Assessment	  Assessment and Plan  89M with PMH of HTN, HLD, gout, CAD s/p CABGx4, afib on xarelto, lymphoma, prostate cancer s/p prostatectomy who presented s/p mechanical fall (+HT, -LOC, +AC) with left retroperitoneal hematoma and left pelvic sidewall/Iliacus intramuscular hematoma with active extravasation    NEUROLOGICAL:  #Acute pain    -APAP 650mg q6h    -Robaxin 500mg q8h    -Lidocaine patch     RESPIRATORY:   #Oxygenation    -saturating well on RA    -Encourage incentive spirometry - pulling 1.75L   #Activity    -increase as tolerated  #Right lower lobe cavitary lesion measuring up to 4.2 cm. Findings concerning for may be infectious, inflammatory or neoplastic in etiology.    -Pulm consult: treat as aspiration pneumonia with 7 day course of augmentin; Repeat CT scan in 4-6 weeks, if still present consider PET CT     CARDS:   #Atrial fibrillation (dx within the last year, follows with Dr. Guillaume)    -Holding home xarelto 20mg QD    -Metoprolol 12.5 BID (takes 50mg ER at home)  #HLD    -continue rosuvastatin 20mg   #HTN    -Metoprolol 12.5 BID (takes 50mg ER at home)    -Holding home lasix 20mg   #CAD with CABG x3  echo 1/23/25: EF 55 to 60%, Mildly increased LV wall thickness, Mildly enlarged left atrium, mild MR, mild [place]    GASTROINTESTINAL/NUTRITION:   #Left retroperitoneal hematoma measuring approximately 7 x 3.8 x 8.3 cm  #left pelvic sidewall/Iliacus intramuscular hematoma measuring 7.1 x 4.7 x 9.6 cm with focus of contrast extravasation noted, consistent with active bleed    -s/p PCC in ED     -1U PRBC 1/23 DAY, additional 1 U PRBC overnight    -IR consult - no acute intervention   -repeat CTA: No active hemorrhage; No significant change in size of left retroperitoneal and pelvic sidewall/iliacus intramuscular hematoma  #Diet, NPO; Except Medications    -aspiration precautions, HOB 30  #GI Prophylaxis    -not indicated  #Bowel regimen    -senna/miralax     -last bowel movement      /RENAL:   #urine output in critically ill  -Cadet removed, f/u 16:00 TOV  #ALIYAH (baseline creatinine 1.2)    -urine lytes prerenal - 500cc LR ordered 1/23  -UOP improving, Cr downtrending  #Gout    -continue home allopurinol 100mg QD  #prostate cancer s/p partial prostatectomy     Monitor UO-cadet in place    Current Rx:     Labs:          BUN/Cr- 33/1.8  -->,  33/1.6  -->          [01-24 @ 01:10]Na  141 // K  4.2 // Mg  2.0 // Phos  3.9  [01-23 @ 20:22]Na  139 // K  4.1 // Mg  2.1 // Phos  4.2        HEME/ONC:   #DVT prophylaxis     -Chemical: holding in the setting of active bleeding      -Mechanical: SCDs  #Left retroperitoneal hematoma measuring approximately 7 x 3.8 x 8.3 cm  #left pelvic sidewall/Iliacus intramuscular hematoma measuring 7.1 x 4.7 x 9.6 cm with focus of contrast extravasation noted, consistent with active bleed    -IR consult - no acute intervention     -s/p PCC (total of 2500U)    -2U PRBC 1/23   -No active hemorrhage on repeat CTA 1/23  #Afib on xarelto    -holding home Xarelto 20 mg oral tablet: 1 tab(s) orally once a day (at bedtime)  #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[facility])     Labs: Hb/Hct:  7.4/22.8  -->,  8.4/26.0  -->                      Plts:  253  -->,  254  -->                 PTT/INR:  35.1/1.95  --->,  TNP/TNP  --->         T&S Expires: 1/26    ID:  #Monitor for leukocytosis   #DTI - negative  #UA negative   #cavitary lesion, likely aspiration PNA  WBC- 9.80  --->>,  9.52  --->>,  11.13  --->>  Temp trend- 24hrs T(F): 98.3 (01-24 @ 08:00), Max: 98.6 (01-24 @ 00:00)  Antibiotics-amoxicillin  875 milliGRAM(s)/clavulanate 1 every 12 hours    Current antibiotics-not indicated       ENDOCRINE:   #Maintain euglycemia     -FSG q6 if NPO or Tube feeds    -Glucose goal 140-180. if above 180 start insulin sliding scale    MSK:  #Right hand and wrist abrasion / ecchymosis     -RUE x ray: No acute fracture or dislocation. Mild degenerative changes of the elbow joint. There is elbow joint effusion. In the setting of trauma, an occult fracture is suspected. Recommend CT of the left elbow noncontrast exam for further evaluation.    Activity - Bedrest:     Additional Instructions:  supine (01-23-25 @ 08:33)    LINES/DRAINS:  PIV, Cadet (1/23-1/24)    ADVANCED DIRECTIVES:  Full Code    HCP/Emergency Contact- Dr. Servin - [phone]    INDICATION FOR SICU/SDU: Hemodynamic monitoring and q6h CBC in the setting of Left retroperitoneal hematoma and left pelvic sidewall/Iliacus intramuscular hematoma with active extravasation    DISPO: SICU  Case discussed with attending Dr. Raygoza

## 2025-01-24 NOTE — DISCHARGE NOTE NURSING/CASE MANAGEMENT/SOCIAL WORK - PATIENT PORTAL LINK FT
You can access the FollowMyHealth Patient Portal offered by [facility] by registering at the following website: [email or web address]. By joining [employer]’s FollowMyHealth portal, you will also be able to view your health information using other applications (apps) compatible with our system.

## 2025-01-24 NOTE — DISCHARGE NOTE PROVIDER - NSFOLLOWUPCLINICS_GEN_ALL_ED_FT
[facility]  Trauma Surgery  [address] [place] [address]  Phone: [phone]  Fax:   Follow Up Time: 2 weeks

## 2025-01-24 NOTE — PROGRESS NOTE ADULT - SUBJECTIVE AND OBJECTIVE BOX
TRAUMA SURGERY PROGRESS NOTE    Patient: LYLA RICHARD , 89y ([birth year])Male   MRN: [id number]  Location: [address]  Visit: 01-23-25 Inpatient  Date: 01-24-25 @ 12:12    Hospital Day #: 2    Procedure/Dx/Injuries: L RETROPERITONEAL HEMATOMA  L IM ILIACUS HEMATOMA W BLUSH  (S/P KCENTRA 1/23)    Events of past 24 hours: 1u PRBC this morning, Hgb improved to 8.4, patient reports feeling well, +OOBTC and ambulating, PT/physiatry for dispo planning    PAST MEDICAL & SURGICAL HISTORY:  HTN (hypertension)  HLD (hyperlipidemia)  Gout  CAD (coronary artery disease)  Chronic atrial fibrillation  S/P CABG x 4  History of repair of left hip joint    Vitals:   T(F): 98.3 (01-24-25 @ 08:00), Max: 98.6 (01-24-25 @ 00:00)  HR: 65 (01-24-25 @ 08:00)  BP: 142/63 (01-24-25 @ 08:00)  RR: 17 (01-24-25 @ 08:00)  SpO2: 97% (01-24-25 @ 08:00)      Diet, DASH/TLC:   Sodium & Cholesterol Restricted      Fluids:     I & O's:    01-23-25 @ 07:01  -  01-24-25 @ 07:00  --------------------------------------------------------  IN:    IV PiggyBack: 100 mL    IV PiggyBack: 40 mL    Lactated Ringers: 2520 mL    Lactated Ringers Bolus: 500 mL    PRBCs (Packed Red Blood Cells): 667 mL  Total IN: 3827 mL    OUT:    Indwelling Catheter - Urethral (mL): 568 mL  Total OUT: 568 mL    Total NET: 3259 mL    PHYSICAL EXAM:  General: NAD  HEENT: NCAT, EOMI. no scalp lacerations   Neck: Soft, midline trachea. no c-spine tenderness  Chest: No chest wall tenderness, no subcutaneous emphysema   Cardiac: S1, S2, RRR  Respiratory: Chest rise equal bilaterally, no labored breathing  Abdomen: Soft, non-distended, non-tender  Groin: Normal appearing, pelvis stable   Ext:  Moving b/l upper and lower extremities. Palpable Radial b/l UE, b/l DP palpable in LE.     MEDICATIONS  (STANDING):  acetaminophen     Tablet .. 650 milliGRAM(s) Oral every 6 hours  allopurinol 100 milliGRAM(s) Oral daily  amoxicillin  875 milliGRAM(s)/clavulanate 1 Tablet(s) Oral every 12 hours  chlorhexidine 2% Cloths 1 Application(s) Topical <User Schedule>  heparin   Injectable 5000 Unit(s) SubCutaneous every 8 hours  lidocaine   4% Patch 1 Patch Transdermal daily  methocarbamol 500 milliGRAM(s) Oral every 8 hours  metoprolol tartrate 12.5 milliGRAM(s) Oral every 12 hours  polyethylene glycol 3350 17 Gram(s) Oral daily  rosuvastatin 20 milliGRAM(s) Oral at bedtime  senna 2 Tablet(s) Oral at bedtime    DVT PROPHYLAXIS: SCDs, heparin   Injectable 5000 Unit(s) SubCutaneous every 8 hours  ANTIBIOTICS: amoxicillin  875 milliGRAM(s)/clavulanate 1 Tablet(s)    LAB/STUDIES:  POCT Blood Glucose.: 121 mg/dL (24 Jan 2025 05:29)  POCT Blood Glucose.: 112 mg/dL (23 Jan 2025 23:03)  POCT Blood Glucose.: 112 mg/dL (23 Jan 2025 17:38)  POCT Blood Glucose.: 154 mg/dL (23 Jan 2025 12:20)                          8.4    11.13 )-----------( 254      ( 24 Jan 2025 08:25 )             26.0       Auto Neutrophil %: 66.3 % (01-24-25 @ 08:25)  Auto Immature Granulocyte %: 0.4 % (01-24-25 @ 08:25)  Auto Neutrophil %: 55.9 % (01-23-25 @ 20:22)  Auto Immature Granulocyte %: 0.3 % (01-23-25 @ 20:22)    01-24    141  |  108  |  33[H]  ----------------------------<  93  4.2   |  21  |  1.6[H]      Calcium: 7.7 mg/dL (01-24-25 @ 01:10)      LFTs:             5.0  | 0.5  | 22       ------------------[69      ( 23 Jan 2025 20:22 )  2.8  | 0.2  | 9            Lactate, Blood: 1.8 mmol/L (01-23-25 @ 13:59)  Lactate, Blood: 2.2 mmol/L (01-23-25 @ 10:40)      Coags:     TNP    ----< TNP     ( 24 Jan 2025 07:35 )     TNP         Urinalysis Basic - ( 24 Jan 2025 01:10 )    Color: x / Appearance: x / SG: x / pH: x  Gluc: 93 mg/dL / Ketone: x  / Bili: x / Urobili: x   Blood: x / Protein: x / Nitrite: x   Leuk Esterase: x / RBC: x / WBC x   Sq Epi: x / Non Sq Epi: x / Bacteria: x      IMAGING:  < from: CT Angio Abdomen and Pelvis w/ IV Cont (01.24.25 @ 06:15) >  IMPRESSION:  Since 1/23/2025:    No significant change in size of left retroperitoneal and pelvic   sidewall/iliacus intramuscular hematoma. Previously seen focus of   contrast extravasation has resolved. No current evidence of active   hemorrhage.

## 2025-01-24 NOTE — DISCHARGE NOTE PROVIDER - HOSPITAL COURSE
89M with PMH of HTN, HLD, gout, CAD s/p CABGx4, afib on xarelto presents to the ED s/p mechanical fall (+HT, -LOC, +AC). Patient lives at home with family next door and ambulates with a walker at baseline. Patient got up to use the bathroom around 3am this morning when he rounded a corner and fell on top of his walker, hitting his left abdomen on the walker as he went down. Patient was unable to get up on his own. Family was nearby and was able to assist him up. Patient reported abdominal pain, presented to [facility] and received a panscan revealing retroperitoneal hemorrhage. Patient transferred to [facility] for further management. ABCs intact, GCS 15. External signs of trauma: right temple abrasion, right finger abrasions.    Patient was admitted to SICU for hemodynamic monitoring and q6h CBC in the setting of left  retroperitoneal hematoma and left pelvic sidewall/Iliacus intramuscular hematoma with active extravasation.    89M with PMH of HTN, HLD, gout, CAD s/p CABGx4, afib on xarelto presents to the ED s/p mechanical fall (+HT, -LOC, +AC). Patient lives at home with family next door and ambulates with a walker at baseline. Patient got up to use the bathroom around 3am this morning when he rounded a corner and fell on top of his walker, hitting his left abdomen on the walker as he went down. Patient was unable to get up on his own. Family was nearby and was able to assist him up. Patient reported abdominal pain, presented to [facility] and received a panscan revealing retroperitoneal hemorrhage. Patient transferred to [facility] for further management. ABCs intact, GCS 15. External signs of trauma: right temple abrasion, right finger abrasions.    Patient was admitted to SICU for hemodynamic monitoring and q6h CBC in the setting of left retroperitoneal hematoma and left pelvic sidewall/Iliacus intramuscular hematoma with active extravasation.     SICU course:   1/23: admitted to SICU, 1u PRBC, total of 2500U PCC; given additional 1 U PRBC overnight. CTA A/P negative for active hemorrhage.  1/24: Hgb stable at 8.4. [place] dc'd, passed TOV. Diet advanced, tolerating well.   Physical therapy recommending home PT, otherwise no needs.  Pulmonology consulted for RLL cavitary lesion on CT, recommending Augmentin for 7 days and follow up outpatient.  Patient is hemodynamically stable and ready to be discharged home. Recommend holding Xarelto for total of two weeks, can resume on 2/6/25.

## 2025-01-24 NOTE — PROGRESS NOTE ADULT - ASSESSMENT
89M with PMH of HTN, HLD, gout, CAD s/p CABGx4, afib on xarelto presents to the ED s/p mechanical fall (+HT, -LOC, +AC). Patient lives at home with family next door and ambulates with a walker at baseline. Patient got up to use the bathroom around 3am this morning when he rounded a corner and fell on top of his walker, hitting his left abdomen on the walker as he went down. Patient was unable to get up on his own. Family was nearby and was able to assist him up. Patient reported abdominal pain, presented to [facility] and received a panscan revealing retroperitoneal hemorrhage. Patient transferred to [facility] for further management. ABCs intact, GCS 15. External signs of trauma: right temple abrasion, right finger abrasions    INJURIES IDENTIFIED:  - L RETROPERITONEAL HEMATOMA  - L IM ILIACUS HEMATOMA W BLUSH  (S/P KCENTRA 1/23)    PLAN:  - Monitor vitals and IOs  - Trend Hgb, transfuse PRN  - IR: no intervention unless adnexxa given or held 6 doses of xarelto  - Pain control  - DVT ppx  - Diet as tolerated  - PT/Phys  - Monitor labs  - Pulm for RLL cavitary lesion: Augmention x 7 days for possible asp pneumonia, repeat CT in 4-6 weeks outpatient    TRAUMA SURGERY SPECTRA: [id number]

## 2025-01-24 NOTE — PHYSICAL THERAPY INITIAL EVALUATION ADULT - PERTINENT HX OF CURRENT PROBLEM, REHAB EVAL
89M with PMH of HTN, HLD, gout, CAD s/p CABGx4, afib on xarelto presents to the ED s/p mechanical fall (+HT, -LOC, +AC). Patient lives at home with family next door and ambulates with a walker at baseline. Patient got up to use the bathroom around 3am this morning when he rounded a corner and fell on top of his walker, hitting his left abdomen on the walker as he went down. Patient was unable to get up on his own. Family was nearby and was able to assist him up. Patient reported abdominal pain, presented to [facility] and received a panscan revealing retroperitoneal hemorrhage. Patient transferred to [facility] for further management admitted to SICU for further management of Left retroperitoneal hematoma and left pelvic sidewall/Iliacus intramuscular hematoma with active extravasation
89M with PMH of HTN, HLD, gout, CAD s/p CABGx4, afib on xarelto, lymphoma, prostate cancer s/p prostatectomy who presented s/p mechanical fall (+HT, -LOC, +AC) with left retroperitoneal hematoma and left pelvic sidewall/Iliacus intramuscular hematoma with active extravasation

## 2025-01-24 NOTE — DISCHARGE NOTE NURSING/CASE MANAGEMENT/SOCIAL WORK - FINANCIAL ASSISTANCE
[facility] provides services at a reduced cost to those who are determined to be eligible through [facility]’s financial assistance program. Information regarding [facility]’s financial assistance program can be found by going to [email or web address] or by calling [phone].

## 2025-01-24 NOTE — PHYSICAL THERAPY INITIAL EVALUATION ADULT - ADDITIONAL COMMENTS
Pt is unreliable historian. As per son in law, Pt resides with family, + 4 steps to enter and stair lift indoor, Pt used to be independent with BADLs able to ambulate using RW at baseline, No home attendant

## 2025-01-24 NOTE — PHYSICAL THERAPY INITIAL EVALUATION ADULT - GENERAL OBSERVATIONS, REHAB EVAL
8:20-8:55. chart reviewed. Pt received semi-mckeon at B/S, alert confused at times, oriented X 2, able to follow one step instructions and agreeable to PT evaluation. + IV, + foly, + monitoring, VSS, denies pain, -ve orthostatic. NAD. H/H 8.4/26.

## 2025-01-30 ENCOUNTER — APPOINTMENT (OUTPATIENT)
Dept: CARDIOLOGY | Facility: CLINIC | Age: 89
End: 2025-01-30

## 2025-01-31 DIAGNOSIS — S09.90XA UNSPECIFIED INJURY OF HEAD, INITIAL ENCOUNTER: ICD-10-CM

## 2025-01-31 DIAGNOSIS — E78.5 HYPERLIPIDEMIA, UNSPECIFIED: ICD-10-CM

## 2025-01-31 DIAGNOSIS — J69.0 PNEUMONITIS DUE TO INHALATION OF FOOD AND VOMIT: ICD-10-CM

## 2025-01-31 DIAGNOSIS — S01.81XA LACERATION WITHOUT FOREIGN BODY OF OTHER PART OF HEAD, INITIAL ENCOUNTER: ICD-10-CM

## 2025-01-31 DIAGNOSIS — N17.9 ACUTE KIDNEY FAILURE, UNSPECIFIED: ICD-10-CM

## 2025-01-31 DIAGNOSIS — Z85.46 PERSONAL HISTORY OF MALIGNANT NEOPLASM OF PROSTATE: ICD-10-CM

## 2025-01-31 DIAGNOSIS — S30.0XXA CONTUSION OF LOWER BACK AND PELVIS, INITIAL ENCOUNTER: ICD-10-CM

## 2025-01-31 DIAGNOSIS — M10.9 GOUT, UNSPECIFIED: ICD-10-CM

## 2025-01-31 DIAGNOSIS — Z95.1 PRESENCE OF AORTOCORONARY BYPASS GRAFT: ICD-10-CM

## 2025-01-31 DIAGNOSIS — I10 ESSENTIAL (PRIMARY) HYPERTENSION: ICD-10-CM

## 2025-01-31 DIAGNOSIS — Z87.891 PERSONAL HISTORY OF NICOTINE DEPENDENCE: ICD-10-CM

## 2025-01-31 DIAGNOSIS — S36.892A CONTUSION OF OTHER INTRA-ABDOMINAL ORGANS, INITIAL ENCOUNTER: ICD-10-CM

## 2025-01-31 DIAGNOSIS — Z79.01 LONG TERM (CURRENT) USE OF ANTICOAGULANTS: ICD-10-CM

## 2025-01-31 DIAGNOSIS — I25.10 ATHEROSCLEROTIC HEART DISEASE OF NATIVE CORONARY ARTERY WITHOUT ANGINA PECTORIS: ICD-10-CM

## 2025-01-31 DIAGNOSIS — I48.91 UNSPECIFIED ATRIAL FIBRILLATION: ICD-10-CM

## 2025-01-31 DIAGNOSIS — Z90.79 ACQUIRED ABSENCE OF OTHER GENITAL ORGAN(S): ICD-10-CM

## 2025-01-31 DIAGNOSIS — T45.515A ADVERSE EFFECT OF ANTICOAGULANTS, INITIAL ENCOUNTER: ICD-10-CM

## 2025-01-31 DIAGNOSIS — D62 ACUTE POSTHEMORRHAGIC ANEMIA: ICD-10-CM

## 2025-01-31 DIAGNOSIS — D68.32 HEMORRHAGIC DISORDER DUE TO EXTRINSIC CIRCULATING ANTICOAGULANTS: ICD-10-CM
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
## 2025-02-06 ENCOUNTER — RX RENEWAL (OUTPATIENT)
Age: 89
End: 2025-02-06

## 2025-03-06 PROBLEM — M10.9 GOUT, UNSPECIFIED: Chronic | Status: ACTIVE | Noted: 2025-01-23

## 2025-03-06 PROBLEM — I48.20 CHRONIC ATRIAL FIBRILLATION, UNSPECIFIED: Chronic | Status: ACTIVE | Noted: 2025-01-23

## 2025-03-06 PROBLEM — I25.10 ATHEROSCLEROTIC HEART DISEASE OF NATIVE CORONARY ARTERY WITHOUT ANGINA PECTORIS: Chronic | Status: ACTIVE | Noted: 2025-01-23

## 2025-03-06 PROBLEM — E78.5 HYPERLIPIDEMIA, UNSPECIFIED: Chronic | Status: ACTIVE | Noted: 2025-01-23

## 2025-03-06 PROBLEM — I10 ESSENTIAL (PRIMARY) HYPERTENSION: Chronic | Status: ACTIVE | Noted: 2025-01-23

## 2025-03-07 ENCOUNTER — OUTPATIENT (OUTPATIENT)
Dept: OUTPATIENT SERVICES | Facility: HOSPITAL | Age: 89
LOS: 1 days | End: 2025-03-07
Payer: MEDICARE

## 2025-03-07 DIAGNOSIS — Z86.718 PERSONAL HISTORY OF OTHER VENOUS THROMBOSIS AND EMBOLISM: ICD-10-CM

## 2025-03-07 DIAGNOSIS — Z98.890 OTHER SPECIFIED POSTPROCEDURAL STATES: Chronic | ICD-10-CM

## 2025-03-07 DIAGNOSIS — Z00.8 ENCOUNTER FOR OTHER GENERAL EXAMINATION: ICD-10-CM

## 2025-03-07 DIAGNOSIS — Z95.1 PRESENCE OF AORTOCORONARY BYPASS GRAFT: Chronic | ICD-10-CM

## 2025-03-07 PROCEDURE — 73560 X-RAY EXAM OF KNEE 1 OR 2: CPT | Mod: 26,LT

## 2025-03-07 PROCEDURE — 73560 X-RAY EXAM OF KNEE 1 OR 2: CPT | Mod: LT

## 2025-03-07 PROCEDURE — 93970 EXTREMITY STUDY: CPT | Mod: 26

## 2025-03-07 PROCEDURE — 93970 EXTREMITY STUDY: CPT

## 2025-03-07 PROCEDURE — 73502 X-RAY EXAM HIP UNI 2-3 VIEWS: CPT | Mod: 26,LT

## 2025-03-07 PROCEDURE — 73502 X-RAY EXAM HIP UNI 2-3 VIEWS: CPT | Mod: LT

## 2025-03-08 DIAGNOSIS — Z86.718 PERSONAL HISTORY OF OTHER VENOUS THROMBOSIS AND EMBOLISM: ICD-10-CM

## 2025-04-14 ENCOUNTER — RX RENEWAL (OUTPATIENT)
Age: 89
End: 2025-04-14

## 2025-05-13 ENCOUNTER — RX RENEWAL (OUTPATIENT)
Age: 89
End: 2025-05-13

## 2025-08-11 ENCOUNTER — RX RENEWAL (OUTPATIENT)
Age: 89
End: 2025-08-11

## 2025-08-15 ENCOUNTER — APPOINTMENT (OUTPATIENT)
Dept: UROLOGY | Facility: CLINIC | Age: 89
End: 2025-08-15